# Patient Record
Sex: FEMALE | Race: WHITE | NOT HISPANIC OR LATINO | Employment: UNEMPLOYED | URBAN - METROPOLITAN AREA
[De-identification: names, ages, dates, MRNs, and addresses within clinical notes are randomized per-mention and may not be internally consistent; named-entity substitution may affect disease eponyms.]

---

## 2017-01-04 DIAGNOSIS — I10 ESSENTIAL HYPERTENSION: Primary | ICD-10-CM

## 2017-01-04 RX ORDER — ATENOLOL 100 MG/1
100 TABLET ORAL DAILY
Qty: 90 TABLET | Refills: 2 | Status: SHIPPED | OUTPATIENT
Start: 2017-01-04 | End: 2017-09-28

## 2017-01-04 RX ORDER — LISINOPRIL 20 MG/1
20 TABLET ORAL DAILY
Qty: 90 TABLET | Refills: 2 | Status: SHIPPED | OUTPATIENT
Start: 2017-01-04 | End: 2017-09-28

## 2017-05-05 DIAGNOSIS — Z12.39 SCREENING FOR BREAST CANCER: Primary | ICD-10-CM

## 2017-09-26 NOTE — PROGRESS NOTES
SUBJECTIVE:   Shana Us is a 71 year old female who presents for Preventive Visit. Her last physical was a couple year ago. She would like to talk about HTN, HLD for which she needs refills.  See below  Are you in the first 12 months of your Medicare Part B coverage?  No    Healthy Habits:    Do you get at least three servings of calcium containing foods daily (dairy, green leafy vegetables, etc.)? yes    Amount of exercise or daily activities, outside of work: 5 day(s) per week    Problems taking medications regularly No    Medication side effects: No    Have you had an eye exam in the past two years? yes    Do you see a dentist twice per year? yes    Do you have sleep apnea, excessive snoring or daytime drowsiness?no      Six Item Cognitive Impairment Test   (6CIT):      What year is it?                               Correct - 0 points    What month is it?                               Correct - 0 points      Give the patient an address to remember with five components:   [place] Vie[place] ( first and last name - 2 components)   [address]  (number and name of street - 2 components)   [place] ( city - 1 component)      About what time is it (within the hour)? Correct - 0 points    Count backwards from 20 to 1:   Correct - 0 points    Say the months of the year in reverse: Correct - 0 points    Repeat the address phrase:   Correct - 0 points    Total 6CIT Score:      0/28    Interpretation: The 6CIT uses an inverse score and questions are weighted to produce a total out of 28. Scores of 0-7 are considered normal and 8 or more significant.    Advantages The test has high sensitivity without compromising specificity even in mild dementia. It is easy to translate linguistically and culturally.  Disadvantages The main disadvantage is in the scoring and weighting of the test, which is initially confusing, however computer models have simplified this greatly.    Probability Statistics: At the 7/8 cut off: Overall  figures sensitivity 90% specificity 100%, in mild dementia sensitivity = 78% , specificity = 100%    Copyright 2000 The [facility], [facility], . Courtesy of Dr. Addison Aguilera        Hyperlipidemia Follow-Up      Rate your low fat/cholesterol diet?: good    Taking statin?  No.      Other lipid medications/supplements?:  Red Yeast Rice, without side effects    Fasting today due for labs    Hypertension Follow-up      Outpatient blood pressures are being checked at home.  Results are elevated but variable. Occasionally gets normal readings but frequently high and always high in the clinic.    Low Salt Diet: no added salt    Patient takes her medication on a regular basis. She denies any specific side effects. Though her blood pressure readings have been elevated she denies symptoms associated with high blood pressure including blurred vision, chest pain, heart palpitations, swelling in her feet, and headache. She's been on high blood pressure medication for many years.        All cardiac risk factors reviewed.    Recommended patient take baby aspirin daily for cardio protection.    Reviewed and updated as needed this visit by clinical staff  Tobacco  Allergies  Meds  Problems  Med Hx  Surg Hx  Fam Hx  Soc Hx        Reviewed and updated as needed this visit by Provider  Tobacco  Allergies  Meds  Problems  Med Hx  Surg Hx  Fam Hx  Soc Hx             Today's PHQ-2 Score:   PHQ-2 ( 1999 [employer]) 9/28/2017 9/9/2016   Q1: Little interest or pleasure in doing things 0 0   Q2: Feeling down, depressed or hopeless 0 0   PHQ-2 Score 0 0         Do you have a Health Care Directive?: Yes: Patient states has Advance Directive and will bring in a copy to clinic.    Current providers sharing in care for this patient include:   Patient Care Team:  Maria G Ross MD as PCP - General (Internal Medicine)  Poli Pearson MD as MD (Family Practice)      Hearing impairment: No    Ability to  successfully perform activities of daily living: Yes, no assistance needed     Fall risk:  Fallen 2 or more times in the past year?: No  Any fall with injury in the past year?: No      Home safety:  none identified    The following health maintenance items are reviewed in Epic and correct as of today:  Health Maintenance   Topic Date Due     HEPATITIS C SCREENING  12/12/1963     DEXA Q5 YR  12/12/1976     COLONOSCOPY Q10 YR  10/01/2016     FALL RISK ASSESSMENT  09/28/2018     MAMMO SCREEN Q2 YR (SYSTEM ASSIGNED)  09/26/2019     TETANUS IMMUNIZATION (SYSTEM ASSIGNED)  03/02/2020     LIPID SCREEN Q5 YR FEMALE (SYSTEM ASSIGNED)  12/03/2020     ADVANCE DIRECTIVE PLANNING Q5 YRS  09/28/2022     INFLUENZA VACCINE (SYSTEM ASSIGNED)  Completed     PNEUMOCOCCAL  Completed     BP Readings from Last 3 Encounters:   09/28/17 (!) 167/92   10/25/16 144/88   09/09/16 132/88    Wt Readings from Last 3 Encounters:   09/28/17 122 lb (55.3 kg)   10/25/16 115 lb (52.2 kg)   09/09/16 121 lb 1.9 oz (54.9 kg)            Patient Active Problem List    Diagnosis Date Noted     Basal cell adenoma 09/28/2017     Priority: Medium     Three occurrences.  Under surveillance with dermatology Q6 months in [place].       Essential hypertension with goal blood pressure less than 140/90 09/09/2016     Priority: Medium     Osteoarthritis of knee 10/01/2013     Priority: Medium     R>L       Seasonal allergies 10/01/2013     Priority: Medium     Nonallergic rhinitis, uses saline wash       Atrophic vaginitis 10/01/2013     Priority: Medium       History reviewed. No pertinent past medical history.    Past Surgical History:   Procedure Laterality Date     BREAST BIOPSY, RT/LT      right breast , benign     COLONOSCOPY  2006     FOOT SURGERY       KNEE SURGERY Right 1994     MOHS MICROGRAPHIC PROCEDURE         Family History   Problem Relation Age of Onset     Breast Cancer Mother 45     High cholesterol Mother      Prostate Cancer Father      High  cholesterol Father      Hypertension Father        Social History   Substance Use Topics     Smoking status: Never Smoker     Smokeless tobacco: Never Used     Alcohol use 4.2 oz/week     7 Standard drinks or equivalent per week       Social History     Social History Narrative    Lives with .  Half the year in [place] and half in [place].    Has a good support system.    Feels safe in all environments.    Wears seatbelt 100% of the time    Wears helmet while biking.    Denies history of abuse, past or present, physical, sexual or emotional.               Current Outpatient Prescriptions   Medication Sig Dispense Refill     atenolol (TENORMIN) 100 MG tablet Take 1 tablet (100 mg) by mouth daily 90 tablet 3     lisinopril (PRINIVIL/ZESTRIL) 20 MG tablet Take 1.5 tablets (30 mg) by mouth daily 135 tablet 2     cetirizine (ZYRTEC) 10 MG tablet Take 1 tablet (10 mg) by mouth daily 30 tablet      red yeast rice 600 MG CAPS daily Take one twice daily 60 capsule 11     cholecalciferol (VITAMIN  -D) 1000 UNITS capsule Take 1 capsule by mouth daily        [DISCONTINUED] atenolol (TENORMIN) 100 MG tablet Take 1 tablet (100 mg) by mouth daily 90 tablet 2     [DISCONTINUED] lisinopril (PRINIVIL/ZESTRIL) 20 MG tablet Take 1 tablet (20 mg) by mouth daily 90 tablet 2     Pneumonia Vaccine:Done  Mammogram Screening: Patient over age 50, mutual decision to screen reflected in health maintenance.    ROS:  C: NEGATIVE for fever, chills, change in weight  I: NEGATIVE for worrisome rashes, moles or lesions  E: NEGATIVE for vision changes or irritation  E/M: NEGATIVE for ear, mouth and throat problems  R: NEGATIVE for significant cough or SOB  B: NEGATIVE for masses, tenderness or discharge  CV: NEGATIVE for chest pain, palpitations or peripheral edema  GI: NEGATIVE for nausea, abdominal pain, heartburn, or change in bowel habits  : NEGATIVE for frequency, dysuria, or hematuria   female: atrophic vaginitis declines hormonal  "treatment  M: NEGATIVE for significant arthralgias or myalgia  N: NEGATIVE for weakness, dizziness or paresthesias  E: NEGATIVE for temperature intolerance, skin/hair changes  H: NEGATIVE for bleeding problems  P: NEGATIVE for changes in mood or affect    OBJECTIVE:   BP (!) 167/92 (BP Location: Left arm, Patient Position: Sitting, Cuff Size: Adult Regular)  Pulse 60  Temp 97.3  F (36.3  C) (Oral)  Ht 5' 5\" (165.1 cm)  Wt 122 lb (55.3 kg)  BMI 20.3 kg/m2 Estimated body mass index is 20.3 kg/(m^2) as calculated from the following:    Height as of this encounter: 5' 5\" (165.1 cm).    Weight as of this encounter: 122 lb (55.3 kg).  EXAM:   GENERAL: healthy, alert and no distress  EYES: Eyes grossly normal to inspection, PERRL and conjunctivae and sclerae normal  HENT: ear canals and TM's normal, nose and mouth without ulcers or lesions  NECK: no adenopathy, no asymmetry, masses, or scars and thyroid normal to palpation.  No bruits  RESP: lungs clear to auscultation - no rales, rhonchi or wheezes  BREAST: normal without masses, tenderness or nipple discharge and no palpable axillary masses or adenopathy  CV: regular rate and rhythm, normal S1 S2, no S3 or S4, no murmur, click or rub, no peripheral edema and peripheral pulses strong  ABDOMEN: soft, nontender, no hepatosplenomegaly, no masses and bowel sounds normal  MS: no gross musculoskeletal defects noted, no clubbing, edema or cyanosis of extremities.  Pulses = and appropriate bilaterally to DP and PT  SKIN: no suspicious lesions or rashes  NEURO: Normal strength and tone, mentation intact and speech normal  PSYCH: mentation appears normal, affect normal/bright  LYMPH: no cervical, supraclavicular, axillary, or inguinal adenopathy    ASSESSMENT / PLAN:       ICD-10-CM    1. Routine general medical examination at a health care facility Z00.00 Hemoglobin A1c ([place])   2. Essential hypertension with goal blood pressure less than 140/90 I10 Basic Metabolic Panel " ([place])   3. Special screening for malignant neoplasms, colon Z12.11 GASTROENTEROLOGY ADULT REF PROCEDURE ONLY   4. Screening for osteoporosis Z13.820 Dexa hip/pelvis/spine*   5. Needs flu shot Z23 HC FLU VACCINE, INCREASED ANTIGEN, PRESV FREE     INJECTION INTRAMUSCULAR OR SUB-Q   6. Encounter for screening mammogram for breast cancer Z12.31 Mammogram - routine screening   7. Screening cholesterol level Z13.220 Lipid Panel ([place])   8. Screening for lipid disorders Z13.220    9. Need for hepatitis C screening test Z11.59 Hepatitis C Screen Reflex to HCV RNA Quant and Genotype   10. Basal cell adenoma D36.9    11. Essential hypertension I10 atenolol (TENORMIN) 100 MG tablet     lisinopril (PRINIVIL/ZESTRIL) 20 MG tablet   12. Atrophic vaginitis N95.2        Hypertension is under poor control. Blood pressure continued to be elevated on retake. Recommend increasing lisinopril to 30 mg once daily continue on atenolol at current dose. Continue with healthy lifestyle diet and weight management.    We discussed treatment of a atrophic vaginitis which has been an issue for her for quite some time. He has been very resistant to use the estrogen treatments both cream and as string have been prescribed in the past. She will consider whether or not she wants to get a prescription for this.    I recommend taking a baby aspirin once daily for cardio protection.  Labs and health maintenance all reviewed.    End of Life Planning:  Patient currently has an advanced directive: Yes.  Practitioner is supportive of decision.    COUNSELING:  Reviewed preventive health counseling, as reflected in patient instructions       Regular exercise       Healthy diet/nutrition       Vision screening       Hearing screening       Dental care       Immunizations    Vaccinated for: Influenza           Aspirin Prophylaxsis       Osteoporosis Prevention/Bone Health       Safe sex practices/STD prevention       Colon cancer screening        "Hepatitis C screening       Advanced Planning       Estimated body mass index is 20.3 kg/(m^2) as calculated from the following:    Height as of this encounter: 5' 5\" (165.1 cm).    Weight as of this encounter: 122 lb (55.3 kg).     reports that she has never smoked. She has never used smokeless tobacco.    Appropriate preventive services were discussed with this patient, including applicable screening as appropriate for cardiovascular disease, diabetes, osteopenia/osteoporosis, and glaucoma.  As appropriate for age/gender, discussed screening for colorectal cancer, prostate cancer, breast cancer, and cervical cancer. Checklist reviewing preventive services available has been given to the patient.    Reviewed patients plan of care and provided an AVS. The Basic Care Plan (routine screening as documented in Health Maintenance) for Shana meets the Care Plan requirement. This Care Plan has been established and reviewed with the Patient.    Counseling Resources:  ATP IV Guidelines  Pooled Cohorts Equation Calculator  Breast Cancer Risk Calculator  FRAX Risk Assessment  ICSI Preventive Guidelines  Dietary Guidelines for Americans, 2010  USDA's MyPlate  ASA Prophylaxis  Lung CA Screening    Giulia Hairston PA-C  [facility]  "

## 2017-09-26 NOTE — PATIENT INSTRUCTIONS

## 2017-09-28 ENCOUNTER — OFFICE VISIT (OUTPATIENT)
Dept: FAMILY MEDICINE | Facility: CLINIC | Age: 72
End: 2017-09-28

## 2017-09-28 VITALS
HEIGHT: 65 IN | WEIGHT: 122 LBS | DIASTOLIC BLOOD PRESSURE: 92 MMHG | BODY MASS INDEX: 20.33 KG/M2 | SYSTOLIC BLOOD PRESSURE: 167 MMHG | HEART RATE: 60 BPM | TEMPERATURE: 97.3 F

## 2017-09-28 DIAGNOSIS — N95.2 ATROPHIC VAGINITIS: ICD-10-CM

## 2017-09-28 DIAGNOSIS — Z13.820 SCREENING FOR OSTEOPOROSIS: ICD-10-CM

## 2017-09-28 DIAGNOSIS — Z11.59 NEED FOR HEPATITIS C SCREENING TEST: ICD-10-CM

## 2017-09-28 DIAGNOSIS — Z23 NEEDS FLU SHOT: ICD-10-CM

## 2017-09-28 DIAGNOSIS — I10 ESSENTIAL HYPERTENSION: ICD-10-CM

## 2017-09-28 DIAGNOSIS — I10 ESSENTIAL HYPERTENSION WITH GOAL BLOOD PRESSURE LESS THAN 140/90: Chronic | ICD-10-CM

## 2017-09-28 DIAGNOSIS — Z13.220 SCREENING CHOLESTEROL LEVEL: ICD-10-CM

## 2017-09-28 DIAGNOSIS — Z12.11 SPECIAL SCREENING FOR MALIGNANT NEOPLASMS, COLON: ICD-10-CM

## 2017-09-28 DIAGNOSIS — Z12.31 ENCOUNTER FOR SCREENING MAMMOGRAM FOR BREAST CANCER: ICD-10-CM

## 2017-09-28 DIAGNOSIS — Z00.00 ROUTINE GENERAL MEDICAL EXAMINATION AT A HEALTH CARE FACILITY: Primary | ICD-10-CM

## 2017-09-28 DIAGNOSIS — D11.9 BASAL CELL ADENOMA: ICD-10-CM

## 2017-09-28 DIAGNOSIS — Z13.220 SCREENING FOR LIPID DISORDERS: ICD-10-CM

## 2017-09-28 LAB
BUN SERPL-MCNC: 12 MG/DL (ref 7–30)
CALCIUM SERPL-MCNC: 10 MG/DL (ref 8.5–10.4)
CHLORIDE SERPLBLD-SCNC: 95 MMOL/L (ref 94–109)
CHOLEST SERPL-MCNC: 279 MG/DL (ref 0–200)
CHOLEST/HDLC SERPL: 3.3 {RATIO} (ref 0–5)
CO2 SERPL-SCNC: 27 MMOL/L (ref 20–32)
CREAT SERPL-MCNC: 0.7 MG/DL (ref 0.6–1.3)
EGFR CALCULATED (BLACK REFERENCE): 106.4
EGFR CALCULATED (NON BLACK REFERENCE): 87.9
FASTING SPECIMEN: NO
GLUCOSE SERPL-MCNC: 105 MG/DL (ref 60–109)
HBA1C MFR BLD: 5.5 % (ref 4.1–5.7)
HCV AB SERPL QL IA: NONREACTIVE
HDLC SERPL-MCNC: 85 MG/DL
LDLC SERPL CALC-MCNC: 172 MG/DL (ref 0–129)
POTASSIUM SERPL-SCNC: 5 MMOL/L (ref 3.4–5.3)
SODIUM SERPL-SCNC: 135 MMOL/L (ref 133–144)
TRIGL SERPL-MCNC: 110 MG/DL (ref 0–150)
VLDL-CHOLESTEROL: 22 (ref 7–32)

## 2017-09-28 RX ORDER — ATENOLOL 100 MG/1
100 TABLET ORAL DAILY
Qty: 90 TABLET | Refills: 3 | Status: SHIPPED | OUTPATIENT
Start: 2017-09-28 | End: 2018-10-02

## 2017-09-28 RX ORDER — LISINOPRIL 20 MG/1
30 TABLET ORAL DAILY
Qty: 135 TABLET | Refills: 2 | Status: SHIPPED | OUTPATIENT
Start: 2017-09-28 | End: 2018-07-27

## 2017-09-28 ASSESSMENT — PAIN SCALES - GENERAL: PAINLEVEL: NO PAIN (0)

## 2017-09-28 NOTE — MR AVS SNAPSHOT
After Visit Summary   9/28/2017    Shana Us    MRN: [id number]           Patient Information     Date Of Birth          1945        Visit Information        Provider Department      9/28/2017 10:00 AM Giulia Hairston PA-C [facility]        Today's Diagnoses     Routine general medical examination at a health care facility    -  1    Essential hypertension with goal blood pressure less than 140/90        Special screening for malignant neoplasms, colon        Screening for osteoporosis        Needs flu shot        Encounter for screening mammogram for breast cancer        Screening cholesterol level        Screening for lipid disorders        Need for hepatitis C screening test        Basal cell adenoma        Essential hypertension        Atrophic vaginitis          Care Instructions      Preventive Health Recommendations  Female Ages 65 +    Yearly exam:     See your health care provider every year in order to  o Review health changes.   o Discuss preventive care.    o Review your medicines if your doctor has prescribed any.      You no longer need a yearly Pap test unless you've had an abnormal Pap test in the past 10 years. If you have vaginal symptoms, such as bleeding or discharge, be sure to talk with your provider about a Pap test.      Every 1 to 2 years, have a mammogram.  If you are over 69, talk with your health care provider about whether or not you want to continue having screening mammograms.      Every 10 years, have a colonoscopy. Or, have a yearly FIT test (stool test). These exams will check for colon cancer.       Have a cholesterol test every 5 years, or more often if your doctor advises it.       Have a diabetes test (fasting glucose) every three years. If you are at risk for diabetes, you should have this test more often.       At age 65, have a bone density scan (DEXA) to check for osteoporosis (brittle bone disease).    Shots:    Get a flu shot each  year.    Get a tetanus shot every 10 years.    Talk to your doctor about your pneumonia vaccines. There are now two you should receive - Pneumovax (PPSV 23) and Prevnar (PCV 13).    Talk to your doctor about the shingles vaccine.    Talk to your doctor about the hepatitis B vaccine.    Nutrition:     Eat at least 5 servings of fruits and vegetables each day.      Eat whole-grain bread, whole-wheat pasta and brown rice instead of white grains and rice.      Talk to your provider about Calcium and Vitamin D.     Lifestyle    Exercise at least 150 minutes a week (30 minutes a day, 5 days a week). This will help you control your weight and prevent disease.      Limit alcohol to one drink per day.      No smoking.       Wear sunscreen to prevent skin cancer.       See your dentist twice a year for an exam and cleaning.      See your eye doctor every 1 to 2 years to screen for conditions such as glaucoma, macular degeneration, cataracts, etc   Preventive Health Recommendations  Female Ages 65 +    Yearly exam:   See your health care provider every year in order to  Review health changes.   Discuss preventive care.    Review your medicines if your doctor has prescribed any.    You no longer need a yearly Pap test unless you've had an abnormal Pap test in the past 10 years. If you have vaginal symptoms, such as bleeding or discharge, be sure to talk with your provider about a Pap test.    Every 1 to 2 years, have a mammogram.  If you are over 69, talk with your health care provider about whether or not you want to continue having screening mammograms.    Every 10 years, have a colonoscopy. Or, have a yearly FIT test (stool test). These exams will check for colon cancer.     Have a cholesterol test every 5 years, or more often if your doctor advises it.     Have a diabetes test (fasting glucose) every three years. If you are at risk for diabetes, you should have this test more often.     At age 65, have a bone density scan  (DEXA) to check for osteoporosis (brittle bone disease).    Shots:  Get a flu shot each year.  Get a tetanus shot every 10 years.  Talk to your doctor about your pneumonia vaccines. There are now two you should receive - Pneumovax (PPSV 23) and Prevnar (PCV 13).  Talk to your doctor about the shingles vaccine.  Talk to your doctor about the hepatitis B vaccine.    Nutrition:   Eat at least 5 servings of fruits and vegetables each day.    Eat whole-grain bread, whole-wheat pasta and brown rice instead of white grains and rice.    Talk to your provider about Calcium and Vitamin D.     Lifestyle  Exercise at least 150 minutes a week (30 minutes a day, 5 days a week). This will help you control your weight and prevent disease.    Limit alcohol to one drink per day.    No smoking.     Wear sunscreen to prevent skin cancer.     See your dentist twice a year for an exam and cleaning.    See your eye doctor every 1 to 2 years to screen for conditions such as glaucoma, macular degeneration, cataracts, etc           Follow-ups after your visit        Additional Services     GASTROENTEROLOGY ADULT REF PROCEDURE ONLY       Last Lab Result: Creatinine (mg/dL)       Date                     Value                 10/25/2016               0.72             ----------  Body mass index is 20.3 kg/(m^2).     Needed:  No  Language:  English    Patient will be contacted to schedule procedure.     Please be aware that coverage of these services is subject to the terms and limitations of your health insurance plan.  Call member services at your health plan with any benefit or coverage questions.  Any procedures must be performed at a [facility] facility OR coordinated by your clinic's referral office.    Please bring the following with you to your appointment:    (1) Any X-Rays, CTs or MRIs which have been performed.  Contact the facility where they were done to arrange for  prior to your scheduled appointment.    (2) List  "of current medications   (3) This referral request   (4) Any documents/labs given to you for this referral                  Future tests that were ordered for you today     Open Future Orders        Priority Expected Expires Ordered    Dexa hip/pelvis/spine* Routine  9/25/2018 9/28/2017    Mammogram - routine screening Routine  9/25/2018 9/28/2017            Who to contact     Please call your clinic at [phone] to:    Ask questions about your health    Make or cancel appointments    Discuss your medicines    Learn about your test results    Speak to your doctor   If you have compliments or concerns about an experience at your clinic, or if you wish to file a complaint, please contact [facility] Patient Relations at [phone] or email us at [email or web address]         Additional Information About Your Visit        BAM LabsharBitStash Information     Copyright Agent gives you secure access to your electronic health record. If you see a primary care provider, you can also send messages to your care team and make appointments. If you have questions, please call your primary care clinic.  If you do not have a primary care provider, please call [phone] and they will assist you.      Copyright Agent is an electronic gateway that provides easy, online access to your medical records. With Copyright Agent, you can request a clinic appointment, read your test results, renew a prescription or communicate with your care team.     To access your existing account, please contact your [facility] or call [phone] for assistance.        Care EveryWhere ID     This is your Care EveryWhere ID. This could be used by other organizations to access your [place] medical records  [id number]        Your Vitals Were     Pulse Temperature Height BMI (Body Mass Index)          60 97.3  F (36.3  C) (Oral) 5' 5\" (165.1 cm) 20.3 kg/m2         Blood Pressure from Last 3 Encounters:   09/28/17 " 168/90   10/25/16 144/88   09/09/16 132/88    Weight from Last 3 Encounters:   09/28/17 122 lb (55.3 kg)   10/25/16 115 lb (52.2 kg)   09/09/16 121 lb 1.9 oz (54.9 kg)              We Performed the Following     Basic Metabolic Panel ([place])     GASTROENTEROLOGY ADULT REF PROCEDURE ONLY     HC FLU VACCINE, INCREASED ANTIGEN, PRESV FREE     Hemoglobin A1c ([place])     Hepatitis C Screen Reflex to HCV RNA Quant and Genotype     INJECTION INTRAMUSCULAR OR SUB-Q     Lipid Panel ([place])          Today's Medication Changes          These changes are accurate as of: 9/28/17 11:19 AM.  If you have any questions, ask your nurse or doctor.               These medicines have changed or have updated prescriptions.        Dose/Directions    lisinopril 20 MG tablet   Commonly known as:  PRINIVIL/ZESTRIL   This may have changed:  how much to take   Used for:  Essential hypertension   Changed by:  Giulia Hairston PA-C        Dose:  30 mg   Take 1.5 tablets (30 mg) by mouth daily   Quantity:  135 tablet   Refills:  2            Where to get your medicines      These medications were sent to [facility]/pharmacy #[id number] - [facility] [address] [place] [address]  [address] [place] [address]     Phone:  [phone]     atenolol 100 MG tablet    lisinopril 20 MG tablet                Primary Care Provider Office Phone # Fax #    Maria G Apoorva Ross [phone]       [address] [place] [address]        Equal Access to Services     PATSY GAVIN : Hadii radha ku hadasho Soomaali, waaxda luqadaha, qaybta kaalmada adeegyada, archana scott . So [facility] [phone].    ATENCIÓN: Si habla español, tiene a lewis disposición servicios gratuitos de asistencia lingüística. Nancy hooper [phone].    We comply with applicable federal civil rights laws and [place] laws. We do not discriminate on the basis of race, color, national origin, age, disability sex, sexual orientation  or gender identity.            Thank you!     Thank you for choosing [facility]  for your care. Our goal is always to provide you with excellent care. Hearing back from our patients is one way we can continue to improve our services. Please take a few minutes to complete the written survey that you may receive in the mail after your visit with us. Thank you!             Your Updated Medication List - Protect others around you: Learn how to safely use, store and throw away your medicines at www.disposemymeds.org.          This list is accurate as of: 9/28/17 11:19 AM.  Always use your most recent med list.                   Brand Name Dispense Instructions for use Diagnosis    atenolol 100 MG tablet    TENORMIN    90 tablet    Take 1 tablet (100 mg) by mouth daily    Essential hypertension       cetirizine 10 MG tablet    zyrTEC    30 tablet    Take 1 tablet (10 mg) by mouth daily        cholecalciferol 1000 UNITS capsule    vitamin  -D     Take 1 capsule by mouth daily        lisinopril 20 MG tablet    PRINIVIL/ZESTRIL    135 tablet    Take 1.5 tablets (30 mg) by mouth daily    Essential hypertension       red yeast rice 600 MG Caps     60 capsule    daily Take one twice daily

## 2017-09-28 NOTE — NURSING NOTE
"71 year old  Chief Complaint   Patient presents with     Physical     pt. is fasting       Blood pressure 152/86, pulse 60, temperature 97.3  F (36.3  C), temperature source Oral, height 5' 5\" (165.1 cm), weight 122 lb (55.3 kg). Body mass index is 20.3 kg/(m^2).  Patient Active Problem List   Diagnosis     Osteoarthritis of knee     Seasonal allergies     Atrophic vaginitis     Essential hypertension with goal blood pressure less than 140/90       Wt Readings from Last 2 Encounters:   09/28/17 122 lb (55.3 kg)   10/25/16 115 lb (52.2 kg)     BP Readings from Last 3 Encounters:   09/28/17 152/86   10/25/16 144/88   09/09/16 132/88         Current Outpatient Prescriptions   Medication     atenolol (TENORMIN) 100 MG tablet     lisinopril (PRINIVIL/ZESTRIL) 20 MG tablet     cetirizine (ZYRTEC) 10 MG tablet     red yeast rice 600 MG CAPS     cholecalciferol (VITAMIN  -D) 1000 UNITS capsule     No current facility-administered medications for this visit.        Social History   Substance Use Topics     Smoking status: Never Smoker     Smokeless tobacco: Never Used     Alcohol use Yes       Health Maintenance Due   Topic Date Due     HEPATITIS C SCREENING  12/12/1964     DEXA Q5 YR  12/12/1976     ADVANCE DIRECTIVE PLANNING Q5 YRS  12/12/2001     FALL RISK ASSESSMENT  12/12/2011     COLONOSCOPY Q10 YR  10/01/2016     INFLUENZA VACCINE (SYSTEM ASSIGNED)  09/01/2017       No results found for: ANDRA Cerna, L.P.NRachel  September 28, 2017 10:05 AM                Shana Us      1.  Has the patient received the information for the influenza vaccine? YES    2.  Does the patient have any of the following contraindications?     Allergy to eggs? No     Allergic reaction to previous influenza vaccines? No     Any other problems to previous influenza vaccines? No     Paralyzed by Guillain-Joice syndrome? No     Currently pregnant? NO     Current moderate or severe illness? No     Allergy to contact lens solution? No    3. "  The vaccine has been administered in the usual fashion and the patient was instructed to wait 20 minutes before leaving the building in the event of an allergic reaction: YES    Vaccination given by   Gagan Capps, CMA    Recorded by GAGAN CAPPS

## 2017-10-10 PROBLEM — E78.5 HYPERLIPIDEMIA LDL GOAL <100: Status: ACTIVE | Noted: 2017-10-10

## 2017-10-10 NOTE — PROGRESS NOTES
"Shana Us is a 71 year old female here for the following issues:    Hyperlipidemia  Shana was seen recently for general check up and had fasting lipids done at that visit. She received lab results with recommendation to start a statin drug. She is here to discuss her lab results. She has questions about statin drugs. She has hx of hypertension. Nonsmoker, no DM. No family hx of ASCVD, no personal hx.     Recent Labs   Lab Test  09/28/17   1046  12/03/15   1446  11/25/14   0917   CHOL  279.0*  236*  264.0*   HDL  85.0  70  71.0   LDL  172.0*  139*  172.0*   TRIG  110.0  138  107.0   CHOLHDLRATIO  3.3   --   3.7     The 10-year ASCVD risk score ([place] PRASAD Jr, et al., 2013) is: 20.9%    HTN  She was last seen in clinic 9/28. She was on atenolol 100mg daily and Lisinopril 20mg daily. Lisinopril was increased to 30mg daily. She was encouraged to follow up. She brings in her BP machine and it correlates with our readings.  Her machine: 126/87  BP Readings from Last 3 Encounters:   10/11/17 125/80   09/28/17 (!) 167/92   10/25/16 144/88       Patient Active Problem List   Diagnosis     Osteoarthritis of knee     Seasonal allergies     Atrophic vaginitis     Essential hypertension with goal blood pressure less than 140/90     Basal cell adenoma       Current Outpatient Prescriptions   Medication Sig Dispense Refill     atenolol (TENORMIN) 100 MG tablet Take 1 tablet (100 mg) by mouth daily 90 tablet 3     lisinopril (PRINIVIL/ZESTRIL) 20 MG tablet Take 1.5 tablets (30 mg) by mouth daily 135 tablet 2     cetirizine (ZYRTEC) 10 MG tablet Take 1 tablet (10 mg) by mouth daily 30 tablet      red yeast rice 600 MG CAPS daily Take one twice daily 60 capsule 11     cholecalciferol (VITAMIN  -D) 1000 UNITS capsule Take 1 capsule by mouth daily          No Known Allergies     EXAM  /80 (BP Location: Left arm, Patient Position: Chair, Cuff Size: Adult Regular)  Pulse 72  Temp 97.3  F (36.3  C) (Oral)  Ht 5' 5\" (165.1 cm)  " Wt 123 lb (55.8 kg)  SpO2 99%  BMI 20.47 kg/m2  Gen: Alert, pleasant, NAD      Assessment:  (I10) Essential hypertension with goal blood pressure less than 140/90  (primary encounter diagnosis)  Comment: Blood pressure in target range  Plan: continue atenolol 100mg and lisinopril 30mg daily    (E78.5) Hyperlipidemia LDL goal <100  Comment: 10 yr ASCVD risk score is 12.2%  Recent Labs   Lab Test  09/28/17   1046  12/03/15   1446  11/25/14   0917   CHOL  279.0*  236*  264.0*   HDL  85.0  70  71.0   LDL  172.0*  139*  172.0*   TRIG  110.0  138  107.0   CHOLHDLRATIO  3.3   --   3.7     Plan: pravastatin (PRAVACHOL) 10 MG tablet, Lipid         Profile, ALT, AST        Recommend statin use. She has many questions. Discussed potential side effects. Recheck levels in 6-8 wks.     Maria G Ross MD  Internal Medicine/Pediatrics

## 2017-10-11 ENCOUNTER — OFFICE VISIT (OUTPATIENT)
Dept: FAMILY MEDICINE | Facility: CLINIC | Age: 72
End: 2017-10-11

## 2017-10-11 VITALS
TEMPERATURE: 97.3 F | HEART RATE: 72 BPM | SYSTOLIC BLOOD PRESSURE: 125 MMHG | WEIGHT: 123 LBS | OXYGEN SATURATION: 99 % | HEIGHT: 65 IN | BODY MASS INDEX: 20.49 KG/M2 | DIASTOLIC BLOOD PRESSURE: 80 MMHG

## 2017-10-11 DIAGNOSIS — E78.5 HYPERLIPIDEMIA LDL GOAL <100: ICD-10-CM

## 2017-10-11 DIAGNOSIS — I10 ESSENTIAL HYPERTENSION WITH GOAL BLOOD PRESSURE LESS THAN 140/90: Primary | Chronic | ICD-10-CM

## 2017-10-11 RX ORDER — PRAVASTATIN SODIUM 10 MG
10 TABLET ORAL DAILY
Qty: 90 TABLET | Refills: 1 | Status: SHIPPED | OUTPATIENT
Start: 2017-10-11 | End: 2018-03-19

## 2017-10-11 ASSESSMENT — PAIN SCALES - GENERAL: PAINLEVEL: NO PAIN (0)

## 2017-10-11 NOTE — MR AVS SNAPSHOT
After Visit Summary   10/11/2017    Shana Us    MRN: [id number]           Patient Information     Date Of Birth          1945        Visit Information        Provider Department      10/11/2017 3:00 PM Maria G Ross MD [facility]        Today's Diagnoses     Essential hypertension with goal blood pressure less than 140/90    -  1    Hyperlipidemia LDL goal <100          Care Instructions    The 10-year ASCVD risk score ([place] PRASAD Jr, et al., 2013) is: 12.2%    Values used to calculate the score:      Age: 71 years      Sex: Female      Is Non- : No      Diabetic: No      Tobacco smoker: No      Systolic Blood Pressure: 125 mmHg      Is BP treated: Yes      HDL Cholesterol: 85 mg/dL      Total Cholesterol: 279 mg/dL            Follow-ups after your visit        Future tests that were ordered for you today     Open Future Orders        Priority Expected Expires Ordered    Lipid Profile Routine 1/1/2018 10/11/2018 10/11/2017    ALT Routine 1/1/2018 10/11/2018 10/11/2017    AST Routine 1/1/2018 10/11/2018 10/11/2017            Who to contact     Please call your clinic at [phone] to:    Ask questions about your health    Make or cancel appointments    Discuss your medicines    Learn about your test results    Speak to your doctor   If you have compliments or concerns about an experience at your clinic, or if you wish to file a complaint, please contact [facility] Patient Relations at [phone] or email us at [email or web address]         Additional Information About Your Visit        MyChart Information     [employer] Tradert gives you secure access to your electronic health record. If you see a primary care provider, you can also send messages to your care team and make appointments. If you have questions, please call your primary care clinic.  If you do not have a primary care provider, please call [phone] and they  "will assist you.      OmnyPay is an electronic gateway that provides easy, online access to your medical records. With OmnyPay, you can request a clinic appointment, read your test results, renew a prescription or communicate with your care team.     To access your existing account, please contact your [facility] or call [phone] for assistance.        Care EveryWhere ID     This is your Care EveryWhere ID. This could be used by other organizations to access your [place] medical records  [id number]        Your Vitals Were     Pulse Temperature Height Pulse Oximetry BMI (Body Mass Index)       72 97.3  F (36.3  C) (Oral) 5' 5\" (165.1 cm) 99% 20.47 kg/m2        Blood Pressure from Last 3 Encounters:   10/11/17 125/80   09/28/17 (!) 167/92   10/25/16 144/88    Weight from Last 3 Encounters:   10/11/17 123 lb (55.8 kg)   09/28/17 122 lb (55.3 kg)   10/25/16 115 lb (52.2 kg)                 Today's Medication Changes          These changes are accurate as of: 10/11/17  3:53 PM.  If you have any questions, ask your nurse or doctor.               Start taking these medicines.        Dose/Directions    pravastatin 10 MG tablet   Commonly known as:  PRAVACHOL   Used for:  Hyperlipidemia LDL goal <100   Started by:  Maria G Ross MD        Dose:  10 mg   Take 1 tablet (10 mg) by mouth daily   Quantity:  90 tablet   Refills:  1            Where to get your medicines      These medications were sent to [facility]/pharmacy #[id number] - [facility] [address] [place] [address]     Phone:  [phone]     pravastatin 10 MG tablet                Primary Care Provider Office Phone # Fax #    Maria G Ross [phone]       [address] [place] [address]        Equal Access to Services     PATSY GAVIN AH: Romario coppola Soteodora, waaxda luqadaha, qaybta kaalarchana zheng. " So [facility] [phone].    ATENCIÓN: Si habla leticia, tiene a lewis disposición servicios gratuitos de asistencia lingüística. Nancy hooper [phone].    We comply with applicable federal civil rights laws and [place] laws. We do not discriminate on the basis of race, color, national origin, age, disability, sex, sexual orientation, or gender identity.            Thank you!     Thank you for choosing [facility]  for your care. Our goal is always to provide you with excellent care. Hearing back from our patients is one way we can continue to improve our services. Please take a few minutes to complete the written survey that you may receive in the mail after your visit with us. Thank you!             Your Updated Medication List - Protect others around you: Learn how to safely use, store and throw away your medicines at www.disposemymeds.org.          This list is accurate as of: 10/11/17  3:53 PM.  Always use your most recent med list.                   Brand Name Dispense Instructions for use Diagnosis    atenolol 100 MG tablet    TENORMIN    90 tablet    Take 1 tablet (100 mg) by mouth daily    Essential hypertension       cetirizine 10 MG tablet    zyrTEC    30 tablet    Take 1 tablet (10 mg) by mouth daily        cholecalciferol 1000 UNITS capsule    vitamin  -D     Take 1 capsule by mouth daily        lisinopril 20 MG tablet    PRINIVIL/ZESTRIL    135 tablet    Take 1.5 tablets (30 mg) by mouth daily    Essential hypertension       pravastatin 10 MG tablet    PRAVACHOL    90 tablet    Take 1 tablet (10 mg) by mouth daily    Hyperlipidemia LDL goal <100       red yeast rice 600 MG Caps     60 capsule    daily Take one twice daily

## 2017-10-11 NOTE — NURSING NOTE
"71 year old  Chief Complaint   Patient presents with     Results     review her lab tests.        Blood pressure 125/80, pulse 72, temperature 97.3  F (36.3  C), temperature source Oral, height 5' 5\" (165.1 cm), weight 123 lb (55.8 kg), SpO2 99 %. Body mass index is 20.47 kg/(m^2).  Patient Active Problem List   Diagnosis     Osteoarthritis of knee     Seasonal allergies     Atrophic vaginitis     Essential hypertension with goal blood pressure less than 140/90     Basal cell adenoma     Hyperlipidemia LDL goal <100       Wt Readings from Last 2 Encounters:   10/11/17 123 lb (55.8 kg)   09/28/17 122 lb (55.3 kg)     BP Readings from Last 3 Encounters:   10/11/17 125/80   09/28/17 (!) 167/92   10/25/16 144/88         Current Outpatient Prescriptions   Medication     atenolol (TENORMIN) 100 MG tablet     lisinopril (PRINIVIL/ZESTRIL) 20 MG tablet     cetirizine (ZYRTEC) 10 MG tablet     red yeast rice 600 MG CAPS     cholecalciferol (VITAMIN  -D) 1000 UNITS capsule     No current facility-administered medications for this visit.        Social History   Substance Use Topics     Smoking status: Never Smoker     Smokeless tobacco: Never Used     Alcohol use 4.2 oz/week     7 Standard drinks or equivalent per week       Health Maintenance Due   Topic Date Due     DEXA Q5 YR  12/12/1976     COLONOSCOPY Q10 YR  10/01/2016       No results found for: ANDRA Banuelos, KAMERON  October 11, 2017 2:51 PM  "

## 2017-10-11 NOTE — PATIENT INSTRUCTIONS
The 10-year ASCVD risk score ([place]die PRASAD Jr et al., 2013) is: 12.2%    Values used to calculate the score:      Age: 71 years      Sex: Female      Is Non- : No      Diabetic: No      Tobacco smoker: No      Systolic Blood Pressure: 125 mmHg      Is BP treated: Yes      HDL Cholesterol: 85 mg/dL      Total Cholesterol: 279 mg/dL

## 2018-01-05 DIAGNOSIS — E78.5 HYPERLIPIDEMIA LDL GOAL <100: ICD-10-CM

## 2018-01-05 LAB
ALT SERPL W P-5'-P-CCNC: 23 U/L (ref 0–50)
AST SERPL W P-5'-P-CCNC: 14 U/L (ref 0–45)
CHOLEST SERPL-MCNC: 199 MG/DL
HDLC SERPL-MCNC: 61 MG/DL
LDLC SERPL CALC-MCNC: 120 MG/DL
NONHDLC SERPL-MCNC: 138 MG/DL
TRIGL SERPL-MCNC: 91 MG/DL

## 2018-03-19 DIAGNOSIS — E78.5 HYPERLIPIDEMIA LDL GOAL <100: ICD-10-CM

## 2018-03-19 RX ORDER — PRAVASTATIN SODIUM 10 MG
10 TABLET ORAL DAILY
Qty: 90 TABLET | Refills: 1 | Status: SHIPPED | OUTPATIENT
Start: 2018-03-19 | End: 2018-10-02

## 2018-03-19 NOTE — TELEPHONE ENCOUNTER
Prescription approved per [facility] Refill Protocol.      Ame Maradiaga RN  March 19, 2018 9:35 AM

## 2018-05-31 ENCOUNTER — OFFICE VISIT (OUTPATIENT)
Dept: FAMILY MEDICINE | Facility: CLINIC | Age: 73
End: 2018-05-31
Payer: COMMERCIAL

## 2018-05-31 VITALS
DIASTOLIC BLOOD PRESSURE: 85 MMHG | WEIGHT: 123.5 LBS | OXYGEN SATURATION: 100 % | HEIGHT: 64 IN | HEART RATE: 64 BPM | BODY MASS INDEX: 21.08 KG/M2 | RESPIRATION RATE: 16 BRPM | TEMPERATURE: 97.8 F | SYSTOLIC BLOOD PRESSURE: 134 MMHG

## 2018-05-31 DIAGNOSIS — R30.0 DYSURIA: ICD-10-CM

## 2018-05-31 DIAGNOSIS — N30.01 ACUTE CYSTITIS WITH HEMATURIA: Primary | ICD-10-CM

## 2018-05-31 LAB
BILIRUBIN UR: NEGATIVE MG/DL
BLOOD UR: NORMAL MG/DL
CLARITY, URINE: NORMAL
COLOR UR: NORMAL
GLUCOSE URINE: NEGATIVE MG/DL
KETONES UR QL: NEGATIVE MG/DL
LEUKOCYTE ESTERASE UR: NORMAL U/L
NITRITE UR QL STRIP: NEGATIVE MG/DL
PH UR STRIP: 7 [PH] (ref 5–7)
PROTEIN UR: NORMAL MG/DL
SP GR UR STRIP: 1.01
UROBILINOGEN UR STRIP-ACNC: NORMAL E.U./DL

## 2018-05-31 RX ORDER — SULFAMETHOXAZOLE/TRIMETHOPRIM 800-160 MG
1 TABLET ORAL 2 TIMES DAILY
Qty: 6 TABLET | Refills: 0 | Status: SHIPPED | OUTPATIENT
Start: 2018-05-31 | End: 2018-06-03

## 2018-05-31 RX ORDER — SULFAMETHOXAZOLE AND TRIMETHOPRIM 400; 80 MG/1; MG/1
1 TABLET ORAL 2 TIMES DAILY
Qty: 6 TABLET | Refills: 0 | Status: SHIPPED | OUTPATIENT
Start: 2018-05-31 | End: 2018-05-31 | Stop reason: DRUGHIGH

## 2018-05-31 ASSESSMENT — ANXIETY QUESTIONNAIRES
2. NOT BEING ABLE TO STOP OR CONTROL WORRYING: NOT AT ALL
1. FEELING NERVOUS, ANXIOUS, OR ON EDGE: NOT AT ALL
IF YOU CHECKED OFF ANY PROBLEMS ON THIS QUESTIONNAIRE, HOW DIFFICULT HAVE THESE PROBLEMS MADE IT FOR YOU TO DO YOUR WORK, TAKE CARE OF THINGS AT HOME, OR GET ALONG WITH OTHER PEOPLE: NOT DIFFICULT AT ALL
5. BEING SO RESTLESS THAT IT IS HARD TO SIT STILL: NOT AT ALL
6. BECOMING EASILY ANNOYED OR IRRITABLE: NOT AT ALL
7. FEELING AFRAID AS IF SOMETHING AWFUL MIGHT HAPPEN: NOT AT ALL
GAD7 TOTAL SCORE: 0
3. WORRYING TOO MUCH ABOUT DIFFERENT THINGS: NOT AT ALL

## 2018-05-31 ASSESSMENT — PATIENT HEALTH QUESTIONNAIRE - PHQ9: 5. POOR APPETITE OR OVEREATING: NOT AT ALL

## 2018-05-31 NOTE — MR AVS SNAPSHOT
"              After Visit Summary   5/31/2018    Shana sU    MRN: [id number]           Patient Information     Date Of Birth          1945        Visit Information        Provider Department      5/31/2018 11:00 AM Lluvia Mejia APRN [facility] [employer] [facility]        Today's Diagnoses     Acute cystitis with hematuria    -  1    Dysuria          Care Instructions    1) Will start Bactrim for 3 days.  2) Return to clinic in 2 weeks for another urinalysis to make sure the blood in your urine has resolved.          * BLADDER INFECTION,Female (Adult)    A bladder infection (\"cystitis\" or \"UTI\") usually causes a constant urge to urinate and a burning when passing urine. Urine may be cloudy, smelly or dark. There may be pain in the lower abdomen. A bladder infection occurs when bacteria from the vaginal area enter the bladder opening (urethra). This can occur from sexual intercourse, wearing tight clothing, dehydration and other factors.  HOME CARE:  1. Drink lots of fluids (at least 6-8 glasses a day, unless you must restrict fluids for other medical reasons). This will force the medicine into your urinary system and flush the bacteria out of your body. Cranberry juice has been shown to help clear out the bacteria.  2. Avoid sexual intercourse until your symptoms are gone.  3. A bladder infection is treated with antibiotics. You may also be given Pyridium (generic = phenazopyridine) to reduce the burning sensation. This medicine will cause your urine to become a bright orange color. The orange urine may stain clothing. You may wear a pad or panty-liner to protect clothing.  PREVENTING FUTURE INFECTIONS:  1. Always wipe from front to back after a bowel movement.  2. Keep the genital area clean and dry.  3. Drink plenty of fluids each day to avoid dehydration.  4. Urinate right after intercourse to flush out the bladder.  5. Wear cotton underwear and cotton-lined panty hose; avoid tight-fitting pants.  6. If " you are on birth control pills and are having frequent bladder infections, discuss with your doctor.  FOLLOW UP: Return to this facility or see your doctor if ALL symptoms are not gone after three days of treatment.  GET PROMPT MEDICAL ATTENTION if any of the following occur:    Fever over 101 F (38.3 C)    No improvement by the third day of treatment    Increasing back or abdominal pain    Repeated vomiting; unable to keep medicine down    Weakness, dizziness or fainting    Vaginal discharge    Pain, redness or swelling in the labia (outer vaginal area)    6927-7670 [employer]. [address] [place] [address]. All rights reserved. This information is not intended as a substitute for professional medical care. Always follow your healthcare professional's instructions.  This information has been modified by your health care provider with permission from the publisher.            Follow-ups after your visit        Who to contact     Please call your clinic at [phone] to:    Ask questions about your health    Make or cancel appointments    Discuss your medicines    Learn about your test results    Speak to your doctor            Additional Information About Your Visit        Runner Information     Runner gives you secure access to your electronic health record. If you see a primary care provider, you can also send messages to your care team and make appointments. If you have questions, please call your primary care clinic.  If you do not have a primary care provider, please call [phone] and they will assist you.      Runner is an electronic gateway that provides easy, online access to your medical records. With Runner, you can request a clinic appointment, read your test results, renew a prescription or communicate with your care team.     To access your existing account, please contact your [facility] or call [phone] for assistance.        Care  "EveryWhere ID     This is your Care EveryWhere ID. This could be used by other organizations to access your [facility] medical records  [id number]        Your Vitals Were     Pulse Temperature Respirations Height Pulse Oximetry BMI (Body Mass Index)    64 97.8  F (36.6  C) (Oral) 16 5' 4.4\" (163.6 cm) 100% 20.94 kg/m2       Blood Pressure from Last 3 Encounters:   05/31/18 134/85   10/11/17 125/80   09/28/17 (!) 167/92    Weight from Last 3 Encounters:   05/31/18 123 lb 8 oz (56 kg)   10/11/17 123 lb (55.8 kg)   09/28/17 122 lb (55.3 kg)              We Performed the Following     Urinalysis, Micro If (UA) ([facility])     Urine Culture Aerobic Bacterial          Today's Medication Changes          These changes are accurate as of 5/31/18 12:03 PM.  If you have any questions, ask your nurse or doctor.               Start taking these medicines.        Dose/Directions    sulfamethoxazole-trimethoprim 400-80 MG per tablet   Commonly known as:  BACTRIM/SEPTRA   Used for:  Acute cystitis with hematuria   Started by:  Lluvia Mejia APRN CNP        Dose:  1 tablet   Take 1 tablet by mouth 2 times daily for 3 days   Quantity:  6 tablet   Refills:  0            Where to get your medicines      These medications were sent to [facility]/pharmacy #[id number] [address] [address]     Phone:  [phone]     sulfamethoxazole-trimethoprim 400-80 MG per tablet                Primary Care Provider Office Phone # Fax #    Maria G Ross [phone]       [address] [place] [address]        Equal Access to Services     MIREYA GAVIN AH: Romario Andrade, elizabet griffin, tara alaniz, archana eldridge. Shama [facility] [phone].    ATENCIÓN: Si habla español, tiene a lewis disposición servicios gratuitos de asistencia lingüística. Llame al [phone].    We comply with applicable federal civil " rights laws and [place] laws. We do not discriminate on the basis of race, color, national origin, age, disability, sex, sexual orientation, or gender identity.            Thank you!     Thank you for choosing [facility]  for your care. Our goal is always to provide you with excellent care. Hearing back from our patients is one way we can continue to improve our services. Please take a few minutes to complete the written survey that you may receive in the mail after your visit with us. Thank you!             Your Updated Medication List - Protect others around you: Learn how to safely use, store and throw away your medicines at www.disposemymeds.org.          This list is accurate as of 5/31/18 12:03 PM.  Always use your most recent med list.                   Brand Name Dispense Instructions for use Diagnosis    atenolol 100 MG tablet    TENORMIN    90 tablet    Take 1 tablet (100 mg) by mouth daily    Essential hypertension       cetirizine 10 MG tablet    zyrTEC    30 tablet    Take 1 tablet (10 mg) by mouth daily        cholecalciferol 1000 units capsule    vitamin  -D     Take 1 capsule by mouth daily        lisinopril 20 MG tablet    PRINIVIL/ZESTRIL    135 tablet    Take 1.5 tablets (30 mg) by mouth daily    Essential hypertension       pravastatin 10 MG tablet    PRAVACHOL    90 tablet    Take 1 tablet (10 mg) by mouth daily    Hyperlipidemia LDL goal <100       red yeast rice 600 MG Caps     60 capsule    daily Take one twice daily        sulfamethoxazole-trimethoprim 400-80 MG per tablet    BACTRIM/SEPTRA    6 tablet    Take 1 tablet by mouth 2 times daily for 3 days    Acute cystitis with hematuria

## 2018-05-31 NOTE — NURSING NOTE
"72 year old  Chief Complaint   Patient presents with     Consult     Pt. presents to the clinic for a possible UTI. Woke up with Painful urination       Blood pressure 134/85, pulse 64, temperature 97.8  F (36.6  C), temperature source Oral, resp. rate 16, height 5' 4.4\" (163.6 cm), weight 123 lb 8 oz (56 kg), SpO2 100 %. Body mass index is 20.94 kg/(m^2).  BP completed using cuff size:    Patient Active Problem List   Diagnosis     Osteoarthritis of knee     Seasonal allergies     Atrophic vaginitis     Essential hypertension with goal blood pressure less than 140/90     Basal cell adenoma     Hyperlipidemia LDL goal <100       Wt Readings from Last 2 Encounters:   05/31/18 123 lb 8 oz (56 kg)   10/11/17 123 lb (55.8 kg)     BP Readings from Last 3 Encounters:   05/31/18 134/85   10/11/17 125/80   09/28/17 (!) 167/92       No Known Allergies    Current Outpatient Prescriptions   Medication     atenolol (TENORMIN) 100 MG tablet     cetirizine (ZYRTEC) 10 MG tablet     cholecalciferol (VITAMIN  -D) 1000 UNITS capsule     pravastatin (PRAVACHOL) 10 MG tablet     lisinopril (PRINIVIL/ZESTRIL) 20 MG tablet     red yeast rice 600 MG CAPS     No current facility-administered medications for this visit.        Social History   Substance Use Topics     Smoking status: Never Smoker     Smokeless tobacco: Never Used     Alcohol use 4.2 oz/week     7 Standard drinks or equivalent per week         Honoring Choices - Health Care Directive Guide offered to patient at time of visit.    Health Maintenance Due   Topic Date Due     DEXA Q5 YR  12/12/1976     COLONOSCOPY Q10 YR  10/01/2016       Immunization History   Administered Date(s) Administered     Influenza (High Dose) 3 valent vaccine 09/09/2016, 09/28/2017     Influenza (IIV3) PF 10/01/2012, 10/01/2013     Pneumo Conj 13-V (2010&after) 09/09/2016     Pneumococcal 23 valent 10/01/2012     Tdap (Adacel,Boostrix) 03/02/2010     Zoster vaccine, live 01/01/2009       No results " found for: PAP      Recent Labs   Lab Test  01/05/18   0930  09/28/17   1046  10/25/16   1547  09/09/16   1052  12/03/15   1446   11/25/14   0917   A1C   --   5.5   --    --    --    --    --    LDL  120*  172.0*   --    --   139*   --   172.0*   HDL  61  85.0   --    --   70   --   71.0   TRIG  91  110.0   --    --   138   --   107.0   ALT  23   --    --    --   19   --   10.0   CR   --   0.7  0.72  0.90  0.84   --   0.9   GFRESTIMATED   --    --   80  62  67   < >   --    GFRESTBLACK   --    --   >90   GFR Calc    74  81   < >   --    ALBUMIN   --    --    --    --   4.3   --   4.1   POTASSIUM   --   5.0  3.7  3.9  4.1   --   4.2   TSH   --    --   0.47   --    --    --    --     < > = values in this interval not displayed.       PHQ-2 ( 1999 Pfizer) 5/31/2018 10/11/2017   Q1: Little interest or pleasure in doing things 0 0   Q2: Feeling down, depressed or hopeless 0 0   PHQ-2 Score 0 0       PHQ-9 SCORE 5/31/2018   Total Score 0       LETICIA-7 SCORE 5/31/2018   Total Score 0       No flowsheet data found.    Tasia Brooks CMA  May 31, 2018 11:17 AM

## 2018-05-31 NOTE — PROGRESS NOTES
"      HPI:       Shana Us is a 72 year old who presents for evaluation of painful urination.     Urinary Tract Symptoms  Shana Us is a 72 year old female who presents with mild a burning sensation with urination that started this morning. This burning is noted just at the very end of urinating. Patient is experiencing frequent urination but says this is normal for her. She also started noticing blood in urine beginning this morning. She denies any delay/urgency in urine. Patient thinks that the burning sensation is progressively worsening. Denies any accompanying symptoms of fever/chills, flank pain, pelvic pain, nausea and vomiting, or vaginal discharge. Patient denies any previous history of UTI before menopause but has had  3-4 UTIs after menopause. She denies any history of kidney stones or falls. Patient is sexually active and has one male partner. She is not sure if anything makes her symptoms worse or better since they just started this morning. \" I haven't had it long enough to know.\" She tried drinking cranberry juice this morning, but noticed no change in the symptoms.    Past Medical History:   Diagnosis Date     Atrophic vaginitis      Basal cell adenoma      Hyperlipidemia      Hypertension      Osteoarthritis      Seasonal allergies      Current Outpatient Prescriptions   Medication     atenolol (TENORMIN) 100 MG tablet     cetirizine (ZYRTEC) 10 MG tablet     cholecalciferol (VITAMIN  -D) 1000 UNITS capsule     pravastatin (PRAVACHOL) 10 MG tablet     sulfamethoxazole-trimethoprim (BACTRIM DS) 800-160 MG per tablet     lisinopril (PRINIVIL/ZESTRIL) 20 MG tablet     red yeast rice 600 MG CAPS     No current facility-administered medications for this visit.       No Known Allergies    Problem, Medication and Allergy Lists were reviewed and are current.  Patient is a new patient to this clinic and so  I reviewed/updated the Past Medical History, the Family History and the Social History.      " "    Review of Systems:   Review of Systems      ROS: 10 point ROS neg other than the symptoms noted above in the HPI.         Physical Exam:   /85 (BP Location: Left arm, Patient Position: Chair, Cuff Size: Adult Regular)  Pulse 64  Temp 97.8  F (36.6  C) (Oral)  Resp 16  Ht 5' 4.4\" (163.6 cm)  Wt 123 lb 8 oz (56 kg)  SpO2 100%  BMI 20.94 kg/m2    Body mass index is 20.94 kg/(m^2).  Vitals were reviewed and were normal.     Physical Exam   Cardiovascular: Normal rate, regular rhythm, normal heart sounds and intact distal pulses.  Exam reveals no gallop and no friction rub.    No murmur heard.  Pulmonary/Chest: Effort normal. No respiratory distress. She has no wheezes. She has no rales. She exhibits no tenderness.   Abdominal: Soft. Bowel sounds are normal. She exhibits no distension and no mass. There is no tenderness. There is no rebound, no guarding and no CVA tenderness.         Results:      Results from the last 24 hours  Results for orders placed or performed in visit on 05/31/18 (from the past 24 hour(s))   Urinalysis, Micro If (UA) ([facility])   Result Value Ref Range    Leukocyte Esterase UR 2+ Negative U/l    Nitrite Urine Negative Negative mg/dL    Protein UR 2+ Negative mg/dL    Glucose Urine Negative Negative mg/dL    Ketones Urine Negative Negative mg/dL    Urobilinogen mg/dL 0.2 E.U./dL 0.2 E.U./dL    Bilirubin UR Negative Negative mg/dL    Blood UR 3+ Negative mg/dL    Specific Gravity Urine 1.010 1.005 - 1.030    pH Urine 7.0 4.5 - 8.0    Color Urine Pink Yellow    Clarity, urine Slightly Cloudy Clear     Laboratory testing pending:  Urine Culture Aerobic Bacterial    Assessment and Plan     (N30.01) Acute cystitis with hematuria  (primary encounter diagnosis)  (R30.0) Dysuria  Comment: Pt with 1 day hx of dysuria and hematuria. S/sx similar to last UTI. No fever or signs of systemic illness on exam. Likely UTI, will begin antimicrobial treatment while awaiting culture and " sensitivity results. Advise returning to clinic in 2 weeks for repeat UA to ensure hematuria is resolved. With the amount of hematuria, ddx includes nephrolithiasis. If symptoms fail to improve or worsen, would consider CT w/o contrast to rule out stones.   Plan:    - Urinalysis, Micro If (UA) ([facility]),    - Urine Culture Aerobic Bacterial   - sulfamethoxazole-trimethoprim (BACTRIM DS) 800-160 MG per tablet   - Counseled on appropriate medication administration   - Counseled on reasons to return to clinic or seek emergency care      Follow-up: Return to clinic if symptoms fail to improve, worsen, or new symptoms develop with the above treatment plan.       Medications Discontinued During This Encounter   Medication Reason     sulfamethoxazole-trimethoprim (BACTRIM/SEPTRA) 400-80 MG per tablet Dose adjustment     Options for treatment and follow-up care were reviewed with the patient. Shana Us  engaged in the decision making process and verbalized understanding of the options discussed and agreed with the final plan.    Obdulia Carrington RN, N Adult/Gerontology NP Student saw and evaluated the above patient along with myself. Note co-authored with student, I agree with the history, physical exam findings, and the assessment and plan.     LIDA De Souza CNP

## 2018-05-31 NOTE — PATIENT INSTRUCTIONS
"1) Will start Bactrim for 3 days.  2) Return to clinic in 2 weeks for another urinalysis to make sure the blood in your urine has resolved.          * BLADDER INFECTION,Female (Adult)    A bladder infection (\"cystitis\" or \"UTI\") usually causes a constant urge to urinate and a burning when passing urine. Urine may be cloudy, smelly or dark. There may be pain in the lower abdomen. A bladder infection occurs when bacteria from the vaginal area enter the bladder opening (urethra). This can occur from sexual intercourse, wearing tight clothing, dehydration and other factors.  HOME CARE:  1. Drink lots of fluids (at least 6-8 glasses a day, unless you must restrict fluids for other medical reasons). This will force the medicine into your urinary system and flush the bacteria out of your body. Cranberry juice has been shown to help clear out the bacteria.  2. Avoid sexual intercourse until your symptoms are gone.  3. A bladder infection is treated with antibiotics. You may also be given Pyridium (generic = phenazopyridine) to reduce the burning sensation. This medicine will cause your urine to become a bright orange color. The orange urine may stain clothing. You may wear a pad or panty-liner to protect clothing.  PREVENTING FUTURE INFECTIONS:  1. Always wipe from front to back after a bowel movement.  2. Keep the genital area clean and dry.  3. Drink plenty of fluids each day to avoid dehydration.  4. Urinate right after intercourse to flush out the bladder.  5. Wear cotton underwear and cotton-lined panty hose; avoid tight-fitting pants.  6. If you are on birth control pills and are having frequent bladder infections, discuss with your doctor.  FOLLOW UP: Return to this facility or see your doctor if ALL symptoms are not gone after three days of treatment.  GET PROMPT MEDICAL ATTENTION if any of the following occur:    Fever over 101 F (38.3 C)    No improvement by the third day of treatment    Increasing back or " abdominal pain    Repeated vomiting; unable to keep medicine down    Weakness, dizziness or fainting    Vaginal discharge    Pain, redness or swelling in the labia (outer vaginal area)    2791-7331 [employer]. [address] [place] [address], [place] [address]. All rights reserved. This information is not intended as a substitute for professional medical care. Always follow your healthcare professional's instructions.  This information has been modified by your health care provider with permission from the publisher.

## 2018-06-01 LAB
BACTERIA SPEC CULT: NO GROWTH
Lab: NORMAL
SPECIMEN SOURCE: NORMAL

## 2018-06-01 ASSESSMENT — PATIENT HEALTH QUESTIONNAIRE - PHQ9: SUM OF ALL RESPONSES TO PHQ QUESTIONS 1-9: 0

## 2018-06-01 ASSESSMENT — ANXIETY QUESTIONNAIRES: GAD7 TOTAL SCORE: 0

## 2018-06-07 ENCOUNTER — TELEPHONE (OUTPATIENT)
Dept: FAMILY MEDICINE | Facility: CLINIC | Age: 73
End: 2018-06-07

## 2018-07-27 DIAGNOSIS — I10 ESSENTIAL HYPERTENSION: ICD-10-CM

## 2018-07-27 RX ORDER — LISINOPRIL 20 MG/1
30 TABLET ORAL DAILY
Qty: 135 TABLET | Refills: 0 | Status: SHIPPED | OUTPATIENT
Start: 2018-07-27 | End: 2018-10-02

## 2018-07-27 NOTE — TELEPHONE ENCOUNTER
Last office visit:  10/11/2017, no future appointment.  Due for visit and labs in 3 months     Prescription approved per [facility] Refill Protocol.      Ame Maradiaga RN  July 27, 2018 3:19 PM

## 2018-08-22 ENCOUNTER — TRANSFERRED RECORDS (OUTPATIENT)
Dept: HEALTH INFORMATION MANAGEMENT | Facility: CLINIC | Age: 73
End: 2018-08-22

## 2018-10-02 ENCOUNTER — OFFICE VISIT (OUTPATIENT)
Dept: FAMILY MEDICINE | Facility: CLINIC | Age: 73
End: 2018-10-02
Payer: COMMERCIAL

## 2018-10-02 VITALS
HEART RATE: 60 BPM | WEIGHT: 124 LBS | SYSTOLIC BLOOD PRESSURE: 93 MMHG | DIASTOLIC BLOOD PRESSURE: 73 MMHG | HEIGHT: 65 IN | TEMPERATURE: 97.4 F | BODY MASS INDEX: 20.66 KG/M2 | OXYGEN SATURATION: 100 %

## 2018-10-02 DIAGNOSIS — E78.5 HYPERLIPIDEMIA LDL GOAL <100: ICD-10-CM

## 2018-10-02 DIAGNOSIS — Z23 FLU VACCINE NEED: ICD-10-CM

## 2018-10-02 DIAGNOSIS — I10 ESSENTIAL HYPERTENSION: ICD-10-CM

## 2018-10-02 DIAGNOSIS — Z12.11 SPECIAL SCREENING FOR MALIGNANT NEOPLASMS, COLON: ICD-10-CM

## 2018-10-02 DIAGNOSIS — Z00.00 ROUTINE GENERAL MEDICAL EXAMINATION AT A HEALTH CARE FACILITY: Primary | ICD-10-CM

## 2018-10-02 LAB
BASOPHILS # BLD AUTO: 0.1 10E9/L (ref 0–0.2)
BASOPHILS NFR BLD AUTO: 0.5 %
BUN SERPL-MCNC: 14 MG/DL (ref 7–30)
CALCIUM SERPL-MCNC: 10 MG/DL (ref 8.5–10.4)
CHLORIDE SERPLBLD-SCNC: 99 MMOL/L (ref 94–109)
CO2 SERPL-SCNC: 28 MMOL/L (ref 20–32)
CREAT SERPL-MCNC: 1 MG/DL (ref 0.6–1.3)
DIFFERENTIAL METHOD BLD: NORMAL
EGFR CALCULATED (BLACK REFERENCE): 70.3
EGFR CALCULATED (NON BLACK REFERENCE): 58.1
EOSINOPHIL # BLD AUTO: 0.2 10E9/L (ref 0–0.7)
EOSINOPHIL NFR BLD AUTO: 2 %
ERYTHROCYTE [DISTWIDTH] IN BLOOD BY AUTOMATED COUNT: 12.6 % (ref 10–15)
GLUCOSE SERPL-MCNC: 96 MG/DL (ref 60–109)
HCT VFR BLD AUTO: 41.6 % (ref 35–47)
HGB BLD-MCNC: 13.6 G/DL (ref 11.7–15.7)
IMM GRANULOCYTES # BLD: 0 10E9/L (ref 0–0.4)
IMM GRANULOCYTES NFR BLD: 0.2 %
LYMPHOCYTES # BLD AUTO: 2.7 10E9/L (ref 0.8–5.3)
LYMPHOCYTES NFR BLD AUTO: 27.2 %
MCH RBC QN AUTO: 31.1 PG (ref 26.5–33)
MCHC RBC AUTO-ENTMCNC: 32.7 G/DL (ref 31.5–36.5)
MCV RBC AUTO: 95 FL (ref 78–100)
MONOCYTES # BLD AUTO: 1 10E9/L (ref 0–1.3)
MONOCYTES NFR BLD AUTO: 9.9 %
NEUTROPHILS # BLD AUTO: 5.9 10E9/L (ref 1.6–8.3)
NEUTROPHILS NFR BLD AUTO: 60.2 %
NRBC # BLD AUTO: 0 10*3/UL
NRBC BLD AUTO-RTO: 0 /100
PLATELET # BLD AUTO: 273 10E9/L (ref 150–450)
POTASSIUM SERPL-SCNC: 5.2 MMOL/L (ref 3.4–5.3)
RBC # BLD AUTO: 4.38 10E12/L (ref 3.8–5.2)
SODIUM SERPL-SCNC: 142 MMOL/L (ref 133–144)
WBC # BLD AUTO: 9.7 10E9/L (ref 4–11)

## 2018-10-02 RX ORDER — LISINOPRIL 20 MG/1
30 TABLET ORAL DAILY
Qty: 135 TABLET | Refills: 3 | Status: SHIPPED | OUTPATIENT
Start: 2018-10-02 | End: 2019-10-08

## 2018-10-02 RX ORDER — ATENOLOL 100 MG/1
100 TABLET ORAL DAILY
Qty: 90 TABLET | Refills: 3 | Status: SHIPPED | OUTPATIENT
Start: 2018-10-02 | End: 2019-09-19

## 2018-10-02 RX ORDER — PRAVASTATIN SODIUM 10 MG
10 TABLET ORAL DAILY
Qty: 90 TABLET | Refills: 3 | Status: SHIPPED | OUTPATIENT
Start: 2018-10-02 | End: 2019-09-19

## 2018-10-02 NOTE — MR AVS SNAPSHOT
After Visit Summary   10/2/2018    Shana Us    MRN: [id number]           Patient Information     Date Of Birth          1945        Visit Information        Provider Department      10/2/2018 3:00 PM Giulia Hairston PA-C [facility]        Today's Diagnoses     Routine general medical examination at a health care facility    -  1    Essential hypertension        Hyperlipidemia LDL goal <100        Special screening for malignant neoplasms, colon        Flu vaccine need          Care Instructions      Preventive Health Recommendations    Female Ages 65 +    Yearly exam:     See your health care provider every year in order to  o Review health changes.   o Discuss preventive care.    o Review your medicines if your doctor has prescribed any.      You no longer need a yearly Pap test unless you've had an abnormal Pap test in the past 10 years. If you have vaginal symptoms, such as bleeding or discharge, be sure to talk with your provider about a Pap test.      Every 1 to 2 years, have a mammogram.  If you are over 69, talk with your health care provider about whether or not you want to continue having screening mammograms.      Every 10 years, have a colonoscopy. Or, have a yearly FIT test (stool test). These exams will check for colon cancer.       Have a cholesterol test every 5 years, or more often if your doctor advises it.       Have a diabetes test (fasting glucose) every three years. If you are at risk for diabetes, you should have this test more often.       At age 65, have a bone density scan (DEXA) to check for osteoporosis (brittle bone disease).    Shots:    Get a flu shot each year.    Get a tetanus shot every 10 years.    Talk to your doctor about your pneumonia vaccines. There are now two you should receive - Pneumovax (PPSV 23) and Prevnar (PCV 13).    Talk to your pharmacist about the shingles vaccine.    Talk to your doctor about the hepatitis B  vaccine.    Nutrition:     Eat at least 5 servings of fruits and vegetables each day.      Eat whole-grain bread, whole-wheat pasta and brown rice instead of white grains and rice.      Get adequate Calcium and Vitamin D.     Lifestyle    Exercise at least 150 minutes a week (30 minutes a day, 5 days a week). This will help you control your weight and prevent disease.      Limit alcohol to one drink per day.      No smoking.       Wear sunscreen to prevent skin cancer.       See your dentist twice a year for an exam and cleaning.      See your eye doctor every 1 to 2 years to screen for conditions such as glaucoma, macular degeneration and cataracts.              Follow-ups after your visit        Who to contact     Please call your clinic at [phone] to:    Ask questions about your health    Make or cancel appointments    Discuss your medicines    Learn about your test results    Speak to your doctor            Additional Information About Your Visit        [employer] Information     [employer] gives you secure access to your electronic health record. If you see a primary care provider, you can also send messages to your care team and make appointments. If you have questions, please call your primary care clinic.  If you do not have a primary care provider, please call [phone] and they will assist you.      [employer] is an electronic gateway that provides easy, online access to your medical records. With [employer], you can request a clinic appointment, read your test results, renew a prescription or communicate with your care team.     To access your existing account, please contact your [facility] or call [phone] for assistance.        Care EveryWhere ID     This is your Care EveryWhere ID. This could be used by other organizations to access your [facility] medical records  [id number]        Your Vitals Were     Pulse Temperature Height Pulse Oximetry BMI (Body Mass Index)        "60 97.4  F (36.3  C) (Oral) 5' 4.8\" (164.6 cm) 100% 20.76 kg/m2        Blood Pressure from Last 3 Encounters:   10/02/18 93/73   05/31/18 134/85   10/11/17 125/80    Weight from Last 3 Encounters:   10/02/18 124 lb (56.2 kg)   05/31/18 123 lb 8 oz (56 kg)   10/11/17 123 lb (55.8 kg)              We Performed the Following     ADMIN INFLUENZA VIRUS VACCINE     Basic Metabolic Panel ([place])     C RIV4 (FLUBLOK) VACCINE RECOMBINANT DNA PRSRV ANTIBIO FREE, IM     CBC with platelets differential          Where to get your medicines      These medications were sent to [facility]/pharmacy #[id number] - [place], [place] - [address] AT [facility].  [address] [facility] [address]     Phone:  [phone]     atenolol 100 MG tablet    lisinopril 20 MG tablet    pravastatin 10 MG tablet          Primary Care Provider Office Phone # Fax #    Maria G Ross [phone]       [address] [place] [address]        Equal Access to Services     PATSY GAVIN AH: Hadjosh tejadao Sonereydaali, waaxda luqadaha, qaybta kaalmada adeegyada, archana scott . So [facility] [phone].    ATENCIÓN: Si habla español, tiene a lewis disposición servicios gratuitos de asistencia lingüística. Mishaame al [phone].    We comply with applicable federal civil rights laws and [place] laws. We do not discriminate on the basis of race, color, national origin, age, disability, sex, sexual orientation, or gender identity.            Thank you!     Thank you for choosing [facility]  for your care. Our goal is always to provide you with excellent care. Hearing back from our patients is one way we can continue to improve our services. Please take a few minutes to complete the written survey that you may receive in the mail after your visit with us. Thank you!             Your Updated Medication List - Protect others around you: Learn how to safely use, " store and throw away your medicines at www.disposemymeds.org.          This list is accurate as of 10/2/18 11:59 PM.  Always use your most recent med list.                   Brand Name Dispense Instructions for use Diagnosis    atenolol 100 MG tablet    TENORMIN    90 tablet    Take 1 tablet (100 mg) by mouth daily    Essential hypertension       cetirizine 10 MG tablet    zyrTEC    30 tablet    Take 1 tablet (10 mg) by mouth daily        cholecalciferol 1000 units capsule    vitamin  -D     Take 1 capsule by mouth daily        lisinopril 20 MG tablet    PRINIVIL/ZESTRIL    135 tablet    Take 1.5 tablets (30 mg) by mouth daily    Essential hypertension       pravastatin 10 MG tablet    PRAVACHOL    90 tablet    Take 1 tablet (10 mg) by mouth daily    Hyperlipidemia LDL goal <100

## 2018-10-02 NOTE — PATIENT INSTRUCTIONS

## 2018-10-02 NOTE — NURSING NOTE
"72 year old  Chief Complaint   Patient presents with     Physical     refill lisinopril.      Imm/Inj     flu shot and wants to discuss shingles vaccine       Blood pressure 93/73, pulse 60, temperature 97.4  F (36.3  C), temperature source Oral, height 5' 4.8\" (164.6 cm), weight 124 lb (56.2 kg), SpO2 100 %. Body mass index is 20.76 kg/(m^2).  Patient Active Problem List   Diagnosis     Osteoarthritis of knee     Seasonal allergies     Atrophic vaginitis     Essential hypertension with goal blood pressure less than 140/90     Basal cell adenoma     Hyperlipidemia LDL goal <100       Wt Readings from Last 2 Encounters:   10/02/18 124 lb (56.2 kg)   05/31/18 123 lb 8 oz (56 kg)     BP Readings from Last 3 Encounters:   10/02/18 93/73   05/31/18 134/85   10/11/17 125/80         Current Outpatient Prescriptions   Medication     atenolol (TENORMIN) 100 MG tablet     cetirizine (ZYRTEC) 10 MG tablet     cholecalciferol (VITAMIN  -D) 1000 UNITS capsule     lisinopril (PRINIVIL/ZESTRIL) 20 MG tablet     pravastatin (PRAVACHOL) 10 MG tablet     No current facility-administered medications for this visit.        Social History   Substance Use Topics     Smoking status: Never Smoker     Smokeless tobacco: Never Used     Alcohol use 4.2 oz/week     7 Standard drinks or equivalent per week       Health Maintenance Due   Topic Date Due     DEXA Q5 YR  12/12/1976     COLONOSCOPY Q10 YR  10/01/2016     INFLUENZA VACCINE (1) 09/01/2018     FALL RISK ASSESSMENT  10/11/2018       No results found for: PAP      October 2, 2018 3:01 PM  "

## 2018-10-02 NOTE — PROGRESS NOTES
SUBJECTIVE:   Shana Us is a 72 year old female who presents for Preventive Visit.  Are you in the first 12 months of your Medicare Part B coverage?  No    Healthy Habits:    Do you get at least three servings of calcium containing foods daily (dairy, green leafy vegetables, etc.)? yes    Amount of exercise or daily activities, outside of work: 5 day(s) per week    Problems taking medications regularly No    Medication side effects: No    Have you had an eye exam in the past two years? yes    Do you see a dentist twice per year? yes    Do you have sleep apnea, excessive snoring or daytime drowsiness?no      Ability to successfully perform activities of daily living: Yes, no assistance needed    Home safety:  none identified     Hearing impairment: No    Fall risk:  Fallen 2 or more times in the past year?: No  Any fall with injury in the past year?: No    Colonoscopy done on this date: 2006 (approximately), results were normal.    Average risk without family history or history of polyps. Discussed Cologard as an option as she is resistant about the colonoscopy.  Mammogram done on this date: 8/22/2018 (approximately), by this group: [facility], results were normal.    Immunizations:  Patient has had besides the fact that she has questions about the Shingrix vaccine.  Tetanus shot is up-to-date and she will be getting the flu shot today.      Hypertension Follow-up: Patient denies symptoms associated with hypertension including headache, blurred vision, chest pain, heart palpitations and pedal edema.  Shana is taking lisinopril 30 mg and atenolol 100 mg daily.     Outpatient blood pressures are being checked at home.  Results are within range.  Less than 120/80    Low Salt Diet: no added salt    Hyperlipidemia Follow-Up  Shana takes pravastatin 10 mg daily.  She started this medication about a year ago and is tolerating well.    Rate your low fat/cholesterol diet?: low fat diet    Taking statin?  Yes,  no muscle aches from statin    Other lipid medications/supplements?:  none    Six Item Cognitive Impairment Test   (6CIT):       What year is it?                                                             Correct - 0 points    What month is it?                                                          Correct - 0 points       Give the patient an address to remember with five components:                        [place]ie[place] ( first and last name - 2 components)                        [place]  (number and name of street - 2                       components)                        [place] ( city - 1 component)       About what time is it (within the hour)?                     Correct - 0 points    Count backwards from 20 to 1:                                    Correct - 0 points    Say the months of the year in reverse:                     Correct - 0 points    Repeat the address phrase:                                         Correct - 0 points     Total 6CIT Score:                                                                                                 0/28     Interpretation: The 6CIT uses an inverse score and questions are weighted to produce a total out of 28. Scores of 0-7 are considered normal and 8 or more significant.     Advantages The test has high sensitivity without compromising specificity even in mild dementia. It is easy to translate linguistically and culturally.  Disadvantages The main disadvantage is in the scoring and weighting of the test, which is initially confusing, however computer models have simplified this greatly.     Probability Statistics: At the 7/8 cut off: Overall figures sensitivity 90% specificity 100%, in mild dementia sensitivity = 78% , specificity = 100%     Copyright 2000 [facility], [facility], [place]. Courtesy of Dr. Addison Aguilera       Reviewed and updated as needed this visit by clinical staff  Tobacco  Allergies  Meds  Problems  Med Hx   Surg Hx  Fam Hx  Soc Hx          Reviewed and updated as needed this visit by Provider  Tobacco  Allergies  Meds  Problems  Med Hx  Surg Hx  Fam Hx  Soc Hx         Social History   Substance Use Topics     Smoking status: Never Smoker     Smokeless tobacco: Never Used     Alcohol use 4.2 oz/week     7 Standard drinks or equivalent per week                           Today's PHQ-2 Score:   PHQ-2 ( 1999 [employer]) 10/2/2018 5/31/2018   Q1: Little interest or pleasure in doing things 0 0   Q2: Feeling down, depressed or hopeless 0 0   PHQ-2 Score 0 0     Do you have a Health Care Directive?: Yes: Advance Directive has been received and scanned.    The following health maintenance items are reviewed in Epic and correct as of today:  Health Maintenance   Topic Date Due     DEXA Q5 YR  12/12/1976     COLONOSCOPY Q10 YR  10/01/2016     FALL RISK ASSESSMENT  10/11/2018     PHQ-2 Q1 YR  10/02/2019     TETANUS IMMUNIZATION (SYSTEM ASSIGNED)  03/02/2020     MAMMO SCREEN Q2 YR (SYSTEM ASSIGNED)  08/22/2020     ADVANCE DIRECTIVE PLANNING Q5 YRS  09/28/2022     LIPID SCREEN Q5 YR FEMALE (SYSTEM ASSIGNED)  01/05/2023     PNEUMOCOCCAL  Completed     INFLUENZA VACCINE  Completed     HEPATITIS C SCREENING  Completed       PAST MEDICAL HISTORY:   Past Medical History:   Diagnosis Date     Atrophic vaginitis      Basal cell adenoma      Hyperlipidemia      Hypertension      Osteoarthritis      Seasonal allergies        PAST SURGICAL HISTORY:   Past Surgical History:   Procedure Laterality Date     BREAST BIOPSY, RT/LT      right breast , benign     COLONOSCOPY  2006     FOOT SURGERY       KNEE SURGERY Right 1994     MOHS MICROGRAPHIC PROCEDURE         FAMILY HISTORY:   Family History   Problem Relation Age of Onset     Breast Cancer Mother 45     High cholesterol Mother      Prostate Cancer Father      High cholesterol Father      Hypertension Father      Hypertension Sister      Hyperlipidemia Sister        SOCIAL HISTORY:  "  Social History   Substance Use Topics     Smoking status: Never Smoker     Smokeless tobacco: Never Used     Alcohol use 4.2 oz/week     7 Standard drinks or equivalent per week     ROS:  CONSTITUTIONAL: NEGATIVE for fever, chills, change in weight  INTEGUMENTARY/SKIN: NEGATIVE for worrisome rashes, moles or lesions  EYES: NEGATIVE for vision changes or irritation  ENT/MOUTH: NEGATIVE for ear, mouth and throat problems  RESP: NEGATIVE for significant cough or SOB  BREAST: NEGATIVE for masses, tenderness or discharge  CV: NEGATIVE for chest pain, palpitations or peripheral edema  GI: NEGATIVE for nausea, abdominal pain, heartburn, or change in bowel habits  : NEGATIVE for frequency, dysuria, or hematuria  MUSCULOSKELETAL: NEGATIVE for significant arthralgias or myalgia  NEURO: NEGATIVE for weakness, dizziness or paresthesias  ENDOCRINE: NEGATIVE for temperature intolerance, skin/hair changes  HEME: NEGATIVE for bleeding problems  PSYCHIATRIC: NEGATIVE for changes in mood or affect    OBJECTIVE:   BP 93/73  Pulse 60  Temp 97.4  F (36.3  C) (Oral)  Ht 5' 4.8\" (164.6 cm)  Wt 124 lb (56.2 kg)  SpO2 100%  BMI 20.76 kg/m2 Estimated body mass index is 20.76 kg/(m^2) as calculated from the following:    Height as of this encounter: 5' 4.8\" (164.6 cm).    Weight as of this encounter: 124 lb (56.2 kg).  EXAM:   GENERAL APPEARANCE: healthy, alert and no distress  EYES: Eyes grossly normal to inspection, PERRL and conjunctivae and sclerae normal  HENT: ear canals and TM's normal, nose and mouth without ulcers or lesions, oropharynx clear and oral mucous membranes moist  NECK: no adenopathy, no asymmetry, masses, or scars and thyroid normal to palpation. No bruits.   RESP: lungs clear to auscultation - no rales, rhonchi or wheezes  BREAST: normal without masses, tenderness or nipple discharge and no palpable axillary masses or adenopathy  CV: regular rate and rhythm, normal S1 S2, no S3 or S4, no murmur, click or rub, " no peripheral edema and peripheral pulses strong  ABDOMEN: soft, nontender, no hepatosplenomegaly, no masses and bowel sounds normal  MS: no musculoskeletal defects are noted and gait is age appropriate without ataxia. no clubbing, edema or cyanosis of extremities. Pulses = and appropriate bilaterally to DP and PT  SKIN: no suspicious lesions or rashes  NEURO: Normal strength and tone, sensory exam grossly normal, mentation intact and speech normal  PSYCH: mentation appears normal and affect normal/bright    ASSESSMENT / PLAN:       ICD-10-CM    1. Routine general medical examination at a health care facility Z00.00    2. Essential hypertension I10 atenolol (TENORMIN) 100 MG tablet     lisinopril (PRINIVIL/ZESTRIL) 20 MG tablet     Basic Metabolic Panel ([place])     CBC with platelets differential   3. Hyperlipidemia LDL goal <100 E78.5 pravastatin (PRAVACHOL) 10 MG tablet   4. Special screening for malignant neoplasms, colon Z12.11    5. Flu vaccine need Z23 C RIV4 (FLUBLOK) VACCINE RECOMBINANT DNA PRSRV ANTIBIO FREE, IM     ADMIN INFLUENZA VIRUS VACCINE      Medications refilled. Continuing healthy lifestyle plenty of exercise and maintain healthy weight. - Diet information reviewed.   Lipids were last done January 2018 continue with heart healthy diet.   Consider taking baby aspirin daily.   Follow-up 12 months sooner if any problems or concerns.   Calcium and vitamin D recommendations discussed regarding prevention of osteoporosis. Weight-bearing exercises encouraged. We discussed DEXA scan which patient declines at this time.  End of Life Planning:  Patient currently has an advanced directive: Yes.  Practitioner is supportive of decision.    COUNSELING:  Reviewed preventive health counseling, as reflected in patient instructions  Special attention given to:       Regular exercise       Healthy diet/nutrition       Vision screening       Hearing screening       Dental care       Immunizations    Vaccinated  "for: Influenza         Osteoporosis Prevention/Bone Health       Colon cancer screening       Advanced Planning     BP Readings from Last 1 Encounters:   10/02/18 93/73     Estimated body mass index is 20.76 kg/(m^2) as calculated from the following:    Height as of this encounter: 5' 4.8\" (164.6 cm).    Weight as of this encounter: 124 lb (56.2 kg).     reports that she has never smoked. She has never used smokeless tobacco.      Counseling Resources:  ATP IV Guidelines  Pooled Cohorts Equation Calculator  Breast Cancer Risk Calculator  FRAX Risk Assessment  ICSI Preventive Guidelines  Dietary Guidelines for Americans, 2010  USDA's MyPlate  ASA Prophylaxis  Lung CA Screening    Giulia Hairston PA-C  [facility]    I, Veena Swan, am serving as a scribe to document services personally performed by Giulia Hairston PA-C, based on data collection and the provider's statements to me.     "

## 2018-10-02 NOTE — NURSING NOTE
"Injectable Influenza Immunization Documentation    1.  Has the patient received the information for the injectable influenza vaccine? YES     2. Is the patient 6 months of age or older? YES     3. Does the patient have any of the following contraindications?         Severe allergy to eggs? No     Severe allergic reaction to previous influenza vaccines? No   Severe allergy to latex? No       History of Guillain-Deerfield syndrome? No     Currently have a temperature greater than 100.4F? No        4.  Severely egg allergic patients should have flu vaccine eligibility assessed by an MD, RN, or pharmacist, and those who received flu vaccine should be observed for 15 min by an MD, RN, Pharmacist, Medical Technician, or member of clinic staff.\": YES    5. Latex-allergic patients should be given latex-free influenza vaccine Yes. Please reference the Vaccine latex table to determine if your clinic s product is latex-containing.       Vaccination given by JENN Nielsen        "

## 2018-12-27 DIAGNOSIS — B37.31 VAGINAL YEAST INFECTION: Primary | ICD-10-CM

## 2018-12-27 RX ORDER — FLUCONAZOLE 150 MG/1
TABLET ORAL
Qty: 1 TABLET | Refills: 0 | Status: SHIPPED | OUTPATIENT
Start: 2018-12-27 | End: 2020-12-08

## 2019-09-18 DIAGNOSIS — I10 ESSENTIAL HYPERTENSION: ICD-10-CM

## 2019-09-18 DIAGNOSIS — E78.5 HYPERLIPIDEMIA LDL GOAL <100: ICD-10-CM

## 2019-09-19 RX ORDER — PRAVASTATIN SODIUM 10 MG
10 TABLET ORAL DAILY
Qty: 90 TABLET | Refills: 0 | Status: SHIPPED | OUTPATIENT
Start: 2019-09-19 | End: 2019-10-08

## 2019-09-19 RX ORDER — ATENOLOL 100 MG/1
100 TABLET ORAL DAILY
Qty: 90 TABLET | Refills: 0 | Status: SHIPPED | OUTPATIENT
Start: 2019-09-19 | End: 2019-10-08

## 2019-10-02 ENCOUNTER — HEALTH MAINTENANCE LETTER (OUTPATIENT)
Age: 74
End: 2019-10-02

## 2019-10-07 NOTE — PATIENT INSTRUCTIONS
Patient Education   Personalized Prevention Plan  You are due for the preventive services outlined below.  Your care team is available to assist you in scheduling these services.  If you have already completed any of these items, please share that information with your care team to update in your medical record.  Health Maintenance Due   Topic Date Due     Osteoporosis Screening  1945     Zoster (Shingles) Vaccine (2 of 3) 02/26/2009     Colonscopy  10/01/2016     PHQ-2  01/01/2019     Flu Vaccine (1) 09/01/2019     FALL RISK ASSESSMENT  10/02/2019     Annual Wellness Visit  10/02/2019        Preventive Health Recommendations    See your health care provider every year to    Review health changes.     Discuss preventive care.      Review your medicines if your doctor has prescribed any.      You no longer need a yearly Pap test unless you've had an abnormal Pap test in the past 10 years. If you have vaginal symptoms, such as bleeding or discharge, be sure to talk with your provider about a Pap test.      Every 1 to 2 years, have a mammogram.  If you are over 69, talk with your health care provider about whether or not you want to continue having screening mammograms.      Every 10 years, have a colonoscopy. Or, have a yearly FIT test (stool test). These exams will check for colon cancer.       Have a cholesterol test every 5 years, or more often if your doctor advises it.       Have a diabetes test (fasting glucose) every three years. If you are at risk for diabetes, you should have this test more often.       At age 65, have a bone density scan (DEXA) to check for osteoporosis (brittle bone disease).    Shots:    Get a flu shot each year.    Get a tetanus shot every 10 years.    Talk to your doctor about your pneumonia vaccines. There are now two you should receive - Pneumovax (PPSV 23) and Prevnar (PCV 13).    Talk to your pharmacist about the shingles vaccine.    Talk to your doctor about the hepatitis B  vaccine.    Nutrition:     Eat at least 5 servings of fruits and vegetables each day.      Eat whole-grain bread, whole-wheat pasta and brown rice instead of white grains and rice.      Get adequate about Calcium and Vitamin D.     Lifestyle    Exercise at least 150 minutes a week (30 minutes a day, 5 days a week). This will help you control your weight and prevent disease.      Limit alcohol to one drink per day.      No smoking.       Wear sunscreen to prevent skin cancer.       See your dentist twice a year for an exam and cleaning.      See your eye doctor every 1 to 2 years to screen for conditions such as glaucoma, macular degeneration, cataracts, etc.    Personalized Prevention Plan  You are due for the preventive services outlined below.  Your care team is available to assist you in scheduling these services.  If you have already completed any of these items, please share that information with your care team to update in your medical record.    Health Maintenance Due   Topic Date Due     Osteoporosis Screening  1945     Zoster (Shingles) Vaccine (2 of 3) 02/26/2009     Colonscopy  10/01/2016     PHQ-2  01/01/2019     Flu Vaccine (1) 09/01/2019     FALL RISK ASSESSMENT  10/02/2019

## 2019-10-08 ENCOUNTER — MEDICAL CORRESPONDENCE (OUTPATIENT)
Dept: HEALTH INFORMATION MANAGEMENT | Facility: CLINIC | Age: 74
End: 2019-10-08

## 2019-10-08 ENCOUNTER — OFFICE VISIT (OUTPATIENT)
Dept: FAMILY MEDICINE | Facility: CLINIC | Age: 74
End: 2019-10-08
Payer: COMMERCIAL

## 2019-10-08 VITALS
HEART RATE: 63 BPM | RESPIRATION RATE: 16 BRPM | DIASTOLIC BLOOD PRESSURE: 83 MMHG | WEIGHT: 124.25 LBS | BODY MASS INDEX: 20.7 KG/M2 | TEMPERATURE: 97.5 F | SYSTOLIC BLOOD PRESSURE: 143 MMHG | OXYGEN SATURATION: 99 % | HEIGHT: 65 IN

## 2019-10-08 DIAGNOSIS — Z13.820 SCREENING FOR OSTEOPOROSIS: ICD-10-CM

## 2019-10-08 DIAGNOSIS — Z12.11 SCREEN FOR COLON CANCER: ICD-10-CM

## 2019-10-08 DIAGNOSIS — E78.5 HYPERLIPIDEMIA LDL GOAL <100: ICD-10-CM

## 2019-10-08 DIAGNOSIS — Z12.31 ENCOUNTER FOR SCREENING MAMMOGRAM FOR BREAST CANCER: ICD-10-CM

## 2019-10-08 DIAGNOSIS — Z00.00 ENCOUNTER FOR MEDICARE ANNUAL WELLNESS EXAM: Primary | ICD-10-CM

## 2019-10-08 DIAGNOSIS — I10 ESSENTIAL HYPERTENSION WITH GOAL BLOOD PRESSURE LESS THAN 140/90: Chronic | ICD-10-CM

## 2019-10-08 DIAGNOSIS — N95.2 ATROPHIC VAGINITIS: ICD-10-CM

## 2019-10-08 DIAGNOSIS — Z23 FLU VACCINE NEED: ICD-10-CM

## 2019-10-08 LAB
ALBUMIN SERPL-MCNC: 4.3 G/DL (ref 3.2–4.5)
ALP SERPL-CCNC: 65 U/L (ref 40–150)
ALT SERPL-CCNC: 24 U/L (ref 0–50)
AST SERPL-CCNC: 42 U/L (ref 0–45)
BILIRUB SERPL-MCNC: 1.1 MG/DL (ref 0.2–1.3)
BUN SERPL-MCNC: 14 MG/DL (ref 7–30)
CALCIUM SERPL-MCNC: 9.9 MG/DL (ref 8.5–10.4)
CHLORIDE SERPLBLD-SCNC: 102 MMOL/L (ref 94–109)
CHOLEST SERPL-MCNC: 226 MG/DL (ref 0–200)
CHOLEST/HDLC SERPL: 2.8 {RATIO} (ref 0–5)
CO2 SERPL-SCNC: 30 MMOL/L (ref 20–32)
CREAT SERPL-MCNC: 0.7 MG/DL (ref 0.6–1.3)
EGFR CALCULATED (BLACK REFERENCE): 105.8
EGFR CALCULATED (NON BLACK REFERENCE): 87.4
FASTING SPECIMEN: NO
GLUCOSE SERPL-MCNC: 97 MG/DL (ref 60–99)
HDLC SERPL-MCNC: 80 MG/DL
LDLC SERPL CALC-MCNC: 126 MG/DL (ref 0–129)
POTASSIUM SERPL-SCNC: 4.2 MMOL/L (ref 3.4–5.3)
PROT SERPL-MCNC: 7.6 G/DL (ref 6.8–8.8)
SODIUM SERPL-SCNC: 143 MMOL/L (ref 137.3–146.3)
TRIGL SERPL-MCNC: 97 MG/DL (ref 0–150)
VLDL-CHOLESTEROL: 19 (ref 7–32)

## 2019-10-08 RX ORDER — ATENOLOL 100 MG/1
100 TABLET ORAL DAILY
Qty: 90 TABLET | Refills: 3 | Status: SHIPPED | OUTPATIENT
Start: 2019-10-08 | End: 2020-10-15

## 2019-10-08 RX ORDER — PRAVASTATIN SODIUM 10 MG
10 TABLET ORAL DAILY
Qty: 90 TABLET | Refills: 3 | Status: SHIPPED | OUTPATIENT
Start: 2019-10-08 | End: 2020-10-15

## 2019-10-08 RX ORDER — LISINOPRIL 20 MG/1
20 TABLET ORAL DAILY
Qty: 90 TABLET | Refills: 3 | Status: SHIPPED | OUTPATIENT
Start: 2019-10-08 | End: 2020-10-15

## 2019-10-08 SDOH — HEALTH STABILITY: MENTAL HEALTH: HOW OFTEN DO YOU HAVE A DRINK CONTAINING ALCOHOL?: 4 OR MORE TIMES A WEEK

## 2019-10-08 SDOH — HEALTH STABILITY: PHYSICAL HEALTH: ON AVERAGE, HOW MANY DAYS PER WEEK DO YOU ENGAGE IN MODERATE TO STRENUOUS EXERCISE (LIKE A BRISK WALK)?: 4 DAYS

## 2019-10-08 SDOH — HEALTH STABILITY: PHYSICAL HEALTH: ON AVERAGE, HOW MANY MINUTES DO YOU ENGAGE IN EXERCISE AT THIS LEVEL?: 50 MIN

## 2019-10-08 SDOH — HEALTH STABILITY: MENTAL HEALTH
STRESS IS WHEN SOMEONE FEELS TENSE, NERVOUS, ANXIOUS, OR CAN'T SLEEP AT NIGHT BECAUSE THEIR MIND IS TROUBLED. HOW STRESSED ARE YOU?: ONLY A LITTLE

## 2019-10-08 SDOH — HEALTH STABILITY: MENTAL HEALTH: HOW MANY STANDARD DRINKS CONTAINING ALCOHOL DO YOU HAVE ON A TYPICAL DAY?: 1 OR 2

## 2019-10-08 SDOH — HEALTH STABILITY: MENTAL HEALTH: HOW OFTEN DO YOU HAVE 6 OR MORE DRINKS ON ONE OCCASION?: NEVER

## 2019-10-08 ASSESSMENT — MIFFLIN-ST. JEOR: SCORE: 1068.85

## 2019-10-08 NOTE — NURSING NOTE
"Injectable Influenza Immunization Documentation    1.  Has the patient received the information for the injectable influenza vaccine? YES     2. Is the patient 6 months of age or older? YES     3. Does the patient have any of the following contraindications?         Severe allergy to eggs? No     Severe allergic reaction to previous influenza vaccines? No   Severe allergy to latex? No       History of Guillain-Sonoita syndrome? No     Currently have a temperature greater than 100.4F? No        4.  Severely egg allergic patients should have flu vaccine eligibility assessed by an MD, RN, or pharmacist, and those who received flu vaccine should be observed for 15 min by an MD, RN, Pharmacist, Medical Technician, or member of clinic staff.\": YES    5. Latex-allergic patients should be given latex-free influenza vaccine Yes. Please reference the Vaccine latex table to determine if your clinic s product is latex-containing.       Vaccination given by JENN Nielsen        "

## 2019-10-08 NOTE — NURSING NOTE
"73 year old  Chief Complaint   Patient presents with     Physical     no concern        Blood pressure (!) 143/83, pulse 63, temperature 97.5  F (36.4  C), temperature source Oral, resp. rate 16, height 1.642 m (5' 4.65\"), weight 56.4 kg (124 lb 4 oz), SpO2 99 %. Body mass index is 20.9 kg/m .  Patient Active Problem List   Diagnosis     Osteoarthritis of knee     Seasonal allergies     Atrophic vaginitis     Essential hypertension with goal blood pressure less than 140/90     Basal cell adenoma     Hyperlipidemia LDL goal <100       Wt Readings from Last 2 Encounters:   10/08/19 56.4 kg (124 lb 4 oz)   10/02/18 56.2 kg (124 lb)     BP Readings from Last 3 Encounters:   10/08/19 (!) 143/83   10/02/18 93/73   05/31/18 134/85         Current Outpatient Medications   Medication     atenolol (TENORMIN) 100 MG tablet     lisinopril (PRINIVIL/ZESTRIL) 20 MG tablet     pravastatin (PRAVACHOL) 10 MG tablet     cetirizine (ZYRTEC) 10 MG tablet     cholecalciferol (VITAMIN  -D) 1000 UNITS capsule     fluconazole (DIFLUCAN) 150 MG tablet     No current facility-administered medications for this visit.        Social History     Tobacco Use     Smoking status: Never Smoker     Smokeless tobacco: Never Used   Substance Use Topics     Alcohol use: Yes     Alcohol/week: 7.0 standard drinks     Types: 7 Standard drinks or equivalent per week     Drug use: No       Health Maintenance Due   Topic Date Due     DEXA  1945     ZOSTER IMMUNIZATION (2 of 3) 02/26/2009     COLONOSCOPY  10/01/2016     PHQ-2  01/01/2019     INFLUENZA VACCINE (1) 09/01/2019     FALL RISK ASSESSMENT  10/02/2019       No results found for: PAP      October 8, 2019 1:09 PM    "

## 2019-10-08 NOTE — PROGRESS NOTES
SUBJECTIVE:   Shana Us is a 73 year old female who presents for Preventive Visit. Last CPE was 8/2018.  Has been doing well. Feels health and stays active.  Healthy diet and exercises frequently by playing tennis and walking.    She has the following issues she would like to discuss: She is in need of labs and medication refills.    Hypertension Follow-up: Shana is taking atenolol 100 mg daily and lisinopril 20 mg daily.  She had been taking lisinopril 30 mg, but was having difficulty splitting the pill in half.  She has occasional difficulty with her vision, which she attributes to her age. Patient denies symptoms associated with high blood pressure including headache, chest pain, heart palpitations, shortness of breath and pedal edema.      Do you check your blood pressure regularly outside of the clinic? Yes - she has a BP machine at home, took BP this morning and read 125/80.    Are you following a low salt diet? No - no added salt    Are your blood pressures ever more than 140 on the top number (systolic) OR more   than 90 on the bottom number (diastolic), for example 140/90? Yes     BP Readings from Last 3 Encounters:   10/08/19 (!) 143/83   10/02/18 93/73   05/31/18 134/85       Hyperlipidemia Follow-Up: She takes pravastatin 10 mg daily, and has been tolerating it well.    Are you having any of the following symptoms? (Select all that apply)  No complaints of shortness of breath, chest pain or pressure.  No increased sweating or nausea with activity.  No left-sided neck or arm pain.  No complaints of pain in calves when walking 1-2 blocks.    Are you regularly taking any medication or supplement to lower your cholesterol?   Yes- pravastatin 10 mg daily    Are you having muscle aches or other side effects that you think could be caused by your cholesterol lowering medication?  No     Vaginal Dryness:  Shana has had increased vaginal dryness and itching.  She originally believed she had a yeast  "infection, but notes sx did not resolve while taking medication.  We discussed options for treatment including topical OTC and estrogen treatments.  She would like to try the estrogen topical/vaginal    Shana has a hx of skin cancer, both basal and squamous cell.  She follows with dermatology regularly, and has a dermatologist in both [place] and [place].  Are you in the first 12 months of your Medicare Part B coverage?  No    Physical Health:    In general, how would you rate your overall physical health? good    Outside of work, how many days during the week do you exercise? Tennis 3-4 times/week    Outside of work, approximately how many minutes a day do you exercise?30-45 minutes    If you drink alcohol do you typically have >3 drinks per day or >7 drinks per week? No    Do you usually eat at least 4 servings of fruit and vegetables a day, include whole grains & fiber and avoid regularly eating high fat or \"junk\" foods? Yes    Do you have any problems taking medications regularly?  No    Do you have any side effects from medications? none    Needs assistance for the following daily activities: no assistance needed    Which of the following safety concerns are present in your home?  none identified     Hearing impairment: No    In the past 6 months, have you been bothered by leaking of urine? No     Mental Health:    In general, how would you rate your overall mental or emotional health? good  PHQ-2 Score:     PHQ-2 ( 1999 [employer]) 10/8/2019 10/2/2018   Q1: Little interest or pleasure in doing things 0 0   Q2: Feeling down, depressed or hopeless 0 0   PHQ-2 Score 0 0       Do you feel safe in your environment? Yes    Do you have a Health Care Directive? Yes: Advance Directive has been received and scanned.    Additional concerns to address?  YES       Fall risk:  Fallen 2 or more times in the past year?: No  Any fall with injury in the past year?: No     Cognitive Screening: Six Item Cognitive Impairment Test   " (6CIT):      What year is it?                               Correct - 0 points    What month is it?                               Correct - 0 points      Give the patient an address to remember with five components:   [place]f Vieira ( first and last name - 2 components)   [address]  (number and name of street - 2 components)   [place] ( city - 1 component)      About what time is it (within the hour)? Correct - 0 points    Count backwards from 20 to 1:   Correct - 0 points    Say the months of the year in reverse: Correct - 0 points    Repeat the address phrase:   Correct - 0 points    Total 6CIT Score:      0/28    Interpretation: The 6CIT uses an inverse score and questions are weighted to produce a total out of 28. Scores of 0-7 are considered normal and 8 or more significant.    Advantages The test has high sensitivity without compromising specificity even in mild dementia. It is easy to translate linguistically and culturally.  Disadvantages The main disadvantage is in the scoring and weighting of the test, which is initially confusing, however computer models have simplified this greatly.    Probability Statistics: At the 7/8 cut off: Overall figures sensitivity 90% specificity 100%, in mild dementia sensitivity = 78% , specificity = 100%    Copyright 2000 [facility], [facility]. Courtesy of Dr. Addison Aguilera      Do you have sleep apnea, excessive snoring or daytime drowsiness?: no      Reviewed and updated as needed this visit by clinical staff  Tobacco  Allergies  Meds  Problems  Med Hx  Surg Hx  Fam Hx  Soc Hx          Reviewed and updated as needed this visit by Provider  Tobacco  Allergies  Meds  Problems  Med Hx  Surg Hx  Fam Hx  Soc Hx         Social History     Socioeconomic History     Marital status:      Spouse name: None     Number of children: 3     Years of education: None     Highest education level: None   Occupational History     Occupation:  retired   Social Needs     Financial resource strain: None     Food insecurity:     Worry: None     Inability: None     Transportation needs:     Medical: None     Non-medical: None   Tobacco Use     Smoking status: Never Smoker     Smokeless tobacco: Never Used   Substance and Sexual Activity     Alcohol use: Yes     Alcohol/week: 7.0 standard drinks     Types: 7 Standard drinks or equivalent per week     Frequency: 4 or more times a week     Drinks per session: 1 or 2     Binge frequency: Never     Drug use: No     Sexual activity: Yes     Partners: Male   Lifestyle     Physical activity:     Days per week: 4 days     Minutes per session: 50 min     Stress: Only a little                                                                                   Social History Narrative    Lives with .  Half the year in [place] and half in [place]. Three children and 5 grandchildren.     Has a good support system.    Feels safe in all environments.    Wears seatbelt 100% of the time    Wears helmet while biking.    Denies history of abuse, past or present, physical, sexual or emotional.        10/08/19    Nicole Hairston PA-C                                   Current providers sharing in care for this patient include:  Patient Care Team:  Giulia Hairston PA-C as PCP - General (Family Practice)  Poli Pearson MD as MD (Family Practice)    The following health maintenance items are reviewed in Epic and correct as of today:  Health Maintenance   Topic Date Due     DEXA  1945     ZOSTER IMMUNIZATION (2 of 3) 02/26/2009     COLONOSCOPY  10/01/2016     DTAP/TDAP/TD IMMUNIZATION (2 - Td) 03/02/2020     MAMMO SCREENING  08/22/2020     MEDICARE ANNUAL WELLNESS VISIT  10/08/2020     FALL RISK ASSESSMENT  10/08/2020     ADVANCE CARE PLANNING  09/28/2022     LIPID  10/08/2024     HEPATITIS C SCREENING  Completed     PHQ-2  Completed     INFLUENZA VACCINE  Completed     PNEUMOCOCCAL IMMUNIZATION 65+ LOW/MEDIUM RISK   "Completed     IPV IMMUNIZATION  Aged Out     MENINGITIS IMMUNIZATION  Aged Out       ROS:  Constitutional, HEENT, cardiovascular, pulmonary, gi and gu systems are negative, except as otherwise noted.    OBJECTIVE:   BP (!) 143/83 (BP Location: Right arm, Patient Position: Sitting, Cuff Size: Adult Regular)   Pulse 63   Temp 97.5  F (36.4  C) (Oral)   Resp 16   Ht 1.642 m (5' 4.65\")   Wt 56.4 kg (124 lb 4 oz)   SpO2 99%   BMI 20.90 kg/m   Estimated body mass index is 20.9 kg/m  as calculated from the following:    Height as of this encounter: 1.642 m (5' 4.65\").    Weight as of this encounter: 56.4 kg (124 lb 4 oz).  EXAM:   GENERAL APPEARANCE: healthy, alert and no distress  EYES: Eyes grossly normal to inspection, PERRL and conjunctivae and sclerae normal  HENT: ear canals and TM's normal, nose and mouth without ulcers or lesions, oropharynx clear and oral mucous membranes moist  NECK: no adenopathy, no asymmetry, masses, or scars and thyroid normal to palpation.  No bruits.  RESP: lungs clear to auscultation - no rales, rhonchi or wheezes  BREAST: normal without masses, tenderness or nipple discharge and no palpable axillary masses or adenopathy  CV: regular rate and rhythm, normal S1 S2, no S3 or S4, no murmur, click or rub, no peripheral edema and peripheral pulses strong  ABDOMEN: soft, nontender, no hepatosplenomegaly, no masses and bowel sounds normal   (female): normal female external genitalia, normal urethral meatus, vaginal mucosal atrophy noted, normal cervix, adnexae, and uterus without masses or abnormal discharge  MS: no musculoskeletal defects are noted and gait is age appropriate without ataxia. no clubbing, edema or cyanosis of extremities. Pulses = and appropriate bilaterally to DP and PT  SKIN: no suspicious lesions or rashes  NEURO: Normal strength and tone, sensory exam grossly normal, mentation intact and speech normal  PSYCH: mentation appears normal and affect " "normal/bright        ASSESSMENT / PLAN:      Diagnosis Comments   1. Encounter for Medicare annual wellness exam  Dexa hip/pelvis/spine*, Mammogram - routine screening    2. Essential hypertension with goal blood pressure less than 140/90  lisinopril (PRINIVIL/ZESTRIL) 20 MG tablet, atenolol (TENORMIN) 100 MG tablet, Comprehensive Metabolic Panel ([place])   Poor control today.  DASH diet  Check readings at home.  Make appointment if BP readings are consistently above 130/85  Continue with regular exercise   3. Hyperlipidemia LDL goal <100  Lipid Panel ([facility]), pravastatin (PRAVACHOL) 10 MG tablet, Comprehensive Metabolic Panel ([place])    4. Atrophic vaginitis  COMPOUNDED NON-CONTROLLED SUBSTANCE (CMPD RX) - PHARMACY TO MIX COMPOUNDED MEDICATION   Estrogen cream   5. Screen for colon cancer  Cologard Rx signed and faxed   6. Screening for osteoporosis  Dexa hip/pelvis/spine*    7. Encounter for screening mammogram for breast cancer  Mammogram ordered   8. Flu vaccine need  C RIV4 (FLUBLOK) VACCINE RECOMBINANT DNA PRSRV ANTIBIO FREE, IM, ADMIN INFLUENZA VIRUS VACCINE mammogram ordered       End of Life Planning:  Patient currently has an advanced directive: Yes.  Practitioner is supportive of decision.    COUNSELING:  Reviewed preventive health counseling, as reflected in patient instructions  Special attention given to:       Regular exercise       Healthy diet/nutrition       Vision screening       Hearing screening       Bladder control       Fall risk prevention       Immunizations    Reviewed.       Alcohol Use       Osteoporosis Prevention/Bone Health       Colon cancer screening       Advanced Planning     Estimated body mass index is 20.9 kg/m  as calculated from the following:    Height as of this encounter: 1.642 m (5' 4.65\").    Weight as of this encounter: 56.4 kg (124 lb 4 oz).     reports that she has never smoked. She has never used smokeless tobacco.      Appropriate preventive services were " discussed with this patient, including applicable screening as appropriate for cardiovascular disease, diabetes, osteopenia/osteoporosis, and glaucoma.  As appropriate for age/gender, discussed screening for colorectal cancer, prostate cancer, breast cancer, and cervical cancer. Checklist reviewing preventive services available has been given to the patient.    Reviewed patients plan of care and provided an AVS. The Basic Care Plan (routine screening as documented in Health Maintenance) for Shana meets the Care Plan requirement. This Care Plan has been established and reviewed with the Patient.    Counseling Resources:  ATP IV Guidelines  Pooled Cohorts Equation Calculator  Breast Cancer Risk Calculator  FRAX Risk Assessment  ICSI Preventive Guidelines  Dietary Guidelines for Americans, 2010  [employer] MyPlate  ASA Prophylaxis  Lung CA Screening    Giulia Hairston PA-C  [facility]    I, Celso Lee, am serving as a scribe to document services personally performed by Giulia Hairston PA-C, based on data collection and the provider's statements to me. Giulia Hairston PA-C, has reviewed, edited, and approv

## 2020-01-07 ENCOUNTER — TRANSFERRED RECORDS (OUTPATIENT)
Dept: HEALTH INFORMATION MANAGEMENT | Facility: CLINIC | Age: 75
End: 2020-01-07

## 2020-01-07 LAB — COLOGUARD-ABSTRACT: NEGATIVE

## 2020-10-13 DIAGNOSIS — E78.5 HYPERLIPIDEMIA LDL GOAL <100: ICD-10-CM

## 2020-10-13 DIAGNOSIS — I10 ESSENTIAL HYPERTENSION WITH GOAL BLOOD PRESSURE LESS THAN 140/90: Chronic | ICD-10-CM

## 2020-10-13 NOTE — TELEPHONE ENCOUNTER
Atenolol  Last office visit: 10/13/20  Next appointment: 11/24/20  Medication last refilled: 10/8/19 #90+3RF    Pravastatin  Last office visit: 10/13/20  Next appointment: 11/24/20  Medication last refilled: 10/8/19 #90+3RF    Lisinopril  Last office visit: 10/13/20  Next appointment: 11/24/20  Medication last refilled: 10/8/19 #90+3RF    Mag    Medication is being filled for 1 time refill only due to:  Patient needs to be seen because it has been more than one year since last visit. Must keep upcoming appointment.     Rhea Naylor RN  10/15/20  4:47 PM

## 2020-10-15 RX ORDER — PRAVASTATIN SODIUM 10 MG
10 TABLET ORAL DAILY
Qty: 30 TABLET | Refills: 0 | Status: SHIPPED | OUTPATIENT
Start: 2020-10-15 | End: 2020-11-11

## 2020-10-15 RX ORDER — LISINOPRIL 20 MG/1
20 TABLET ORAL DAILY
Qty: 30 TABLET | Refills: 0 | Status: SHIPPED | OUTPATIENT
Start: 2020-10-15 | End: 2020-11-11

## 2020-10-15 RX ORDER — ATENOLOL 100 MG/1
100 TABLET ORAL DAILY
Qty: 30 TABLET | Refills: 0 | Status: SHIPPED | OUTPATIENT
Start: 2020-10-15 | End: 2020-11-11

## 2020-11-09 DIAGNOSIS — E78.5 HYPERLIPIDEMIA LDL GOAL <100: ICD-10-CM

## 2020-11-09 DIAGNOSIS — I10 ESSENTIAL HYPERTENSION WITH GOAL BLOOD PRESSURE LESS THAN 140/90: Chronic | ICD-10-CM

## 2020-11-09 NOTE — TELEPHONE ENCOUNTER
Atenolol: Last time prescribed: 10/15/2020 , 30 tabs x 0 refills  Pravastatin: Last time prescribed: 10/15/2020 , 30 tabs x 0 refills  Lisinopril: Last time prescribed: 10/15/2020 , 30 tabs x 0 refills    Last office visit: 10/08/19  Next appointment: 11/24/2020    Medication is being filled for 1 time refill only due to:  Patient needs labs bmp, lipid panel. Future labs ordered no, as has appt in 2 weeks with PCP. Patient needs to be seen because needs BP follow up and lab work .  Has been a year since seen.     Pt has an appt as above   Refill I month to get to appt       Ame Maradiaga RN  November 11, 2020 10:32 AM

## 2020-11-11 RX ORDER — PRAVASTATIN SODIUM 10 MG
10 TABLET ORAL DAILY
Qty: 30 TABLET | Refills: 0 | Status: SHIPPED | OUTPATIENT
Start: 2020-11-11 | End: 2020-12-08

## 2020-11-11 RX ORDER — LISINOPRIL 20 MG/1
20 TABLET ORAL DAILY
Qty: 30 TABLET | Refills: 0 | Status: SHIPPED | OUTPATIENT
Start: 2020-11-11 | End: 2020-12-08

## 2020-11-11 RX ORDER — ATENOLOL 100 MG/1
100 TABLET ORAL DAILY
Qty: 30 TABLET | Refills: 0 | Status: SHIPPED | OUTPATIENT
Start: 2020-11-11 | End: 2020-12-08

## 2020-11-11 NOTE — TELEPHONE ENCOUNTER
[facility] Call Center    Phone Message    May a detailed message be left on voicemail: yes     Reason for Call: Medication Refill Request    Has the patient contacted the pharmacy for the refill? Yes   Name of medication being requested:   Atenolol: Last time prescribed: 10/15/2020 , 30 tabs x 0 refills  Pravastatin: Last time prescribed: 10/15/2020 , 30 tabs x 0 refills  Lisinopril: Last time prescribed: 10/15/2020 , 30 tabs x 0 refills  Provider who prescribed the medication: Nicole Hairston   Pharmacy: [facility]/PHARMACY #[id number] [place], [place] - [address] [facility] AT [facility] & [address]    Date medication is needed: ASAP - Patient is not sure if she will be coming back home in time for her appointment 11/24 because of COVID. Patient was wondering if she could have her prescriptions sent to [place]. Please call patient if there's any questions.          Action Taken: Message routed to:  [facility]    Travel Screening: Not Applicable       Saw this message after sent one month supply to local [facility] thinking pt would be here in a couple of weeks.   Called pt to hear her plan regarding travel, and when she thinks will be back to [place].  Can not do virtual visit as will be out of state. Once hear back , will confer with he provider.     Ame Maradiaga RN  November 11, 2020 10:54 AM    Pt called back , and she states they are pretty certain they  now are coming back for the appt.  She will call her [facility] to have them transfer the scripts from [facility].    Ame Maradiaga RN  November 11, 2020 11:11 AM

## 2020-12-04 DIAGNOSIS — E78.5 HYPERLIPIDEMIA LDL GOAL <100: ICD-10-CM

## 2020-12-04 DIAGNOSIS — I10 ESSENTIAL HYPERTENSION WITH GOAL BLOOD PRESSURE LESS THAN 140/90: Chronic | ICD-10-CM

## 2020-12-04 NOTE — TELEPHONE ENCOUNTER
Atenolol Last time prescribed: 11/11/20 , 30 tabs/caps x 0 refills  Lisinopril Last time prescribed: 11/11/20 , 30 tabs/caps x 0 refills  Pravastatin Last time prescribed: 11/11/20 , 30 tabs/caps x 0 refills  Last office visit: 10/8/19  Next appointment: 12/15/20      Medication is being filled for 1 time refill only due to:  Patient needs to be seen because it has been more than one year since last visit. Keep upcoming appointment     Rhea Naylor RN  12/08/20  10:33 AM

## 2020-12-08 RX ORDER — LISINOPRIL 20 MG/1
20 TABLET ORAL DAILY
Qty: 15 TABLET | Refills: 0 | Status: SHIPPED | OUTPATIENT
Start: 2020-12-08 | End: 2020-12-15

## 2020-12-08 RX ORDER — PRAVASTATIN SODIUM 10 MG
10 TABLET ORAL DAILY
Qty: 15 TABLET | Refills: 0 | Status: SHIPPED | OUTPATIENT
Start: 2020-12-08 | End: 2020-12-15

## 2020-12-08 RX ORDER — ATENOLOL 100 MG/1
100 TABLET ORAL DAILY
Qty: 15 TABLET | Refills: 0 | Status: SHIPPED | OUTPATIENT
Start: 2020-12-08 | End: 2020-12-15

## 2020-12-11 NOTE — PROGRESS NOTES
"  SUBJECTIVE:   Shana Us is a 74 year old female who presents for Preventive Visit/Medicare Wellness. Generally very healthy.        Physical Health:    In general, how would you rate your overall physical health? excellent    Outside of work, how many days during the week do you exercise? 4-5 days/week    Outside of work, approximately how many minutes a day do you exercise?45-60 minutes    If you drink alcohol do you typically have >3 drinks per day or >7 drinks per week? No    Do you usually eat at least 4 servings of fruit and vegetables a day, include whole grains & fiber and avoid regularly eating high fat or \"junk\" foods? Yes    Do you have any problems taking medications regularly?  No    Do you have any side effects from medications? none    Needs assistance for the following daily activities: no assistance needed    Which of the following safety concerns are present in your home?  none identified     Hearing impairment: No    In the past 6 months, have you been bothered by leaking of urine? No    History of basal and squamous cell skin cancer.    Mental Health:    In general, how would you rate your overall mental or emotional health? excellent    PHQ-2 (  [employer]) 12/15/2020 10/8/2019   Q1: Little interest or pleasure in doing things 0 0   Q2: Feeling down, depressed or hopeless 0 0   PHQ-2 Score 0 0       Do you feel safe in your environment? Yes    Have you ever done Advance Care Planning? (For example, a Health Directive, POLST, or a discussion with a medical provider or your loved ones about your wishes): Yes, advance care planning is on file.    Fall risk:  Fallen 2 or more times in the past year?: No  Any fall with injury in the past year?: No  click delete button to remove this line now  Cognitive Screenin) Repeat 3 items (Leader, Season, Table)     2) Clock draw: NORMAL  3) 3 item recall: Recalls 1 object   Results: NORMAL clock, 1-2 items recalled: COGNITIVE IMPAIRMENT LESS " LIKELY    Mini-CogTM Copyright VAISHALI Foss. Licensed by the author for use in [facility]; reprinted with permission (edwar@[facility]). All rights reserved.      Do you have sleep apnea, excessive snoring or daytime drowsiness?: no    Additional concerns to address?  YES--due for follow up and labs for chronic conditions.    Hyperlipidemia Follow-Up    Are you regularly taking any medication or supplement to lower your cholesterol?   Yes    Are you having muscle aches or other side effects that you think could be caused by your cholesterol lowering medication?  No  Recent Labs   Lab Test 10/08/19  1330 01/05/18  0930 09/28/17  1046   CHOL 226.0* 199 279.0*   HDL 80.0 61 85.0   .0 120* 172.0*   TRIG 97.0 91 110.0   CHOLHDLRATIO 2.8  --  3.3       Hypertension Follow-up: Patient denies symptoms associated with high blood pressure including blurred vision, headache, chest pain, heart palpitations, shortness of breath and pedal edema.    Do you check your blood pressure regularly outside of the clinic? No     Are you following a low salt diet? Yes    Are your blood pressures ever more than 140 on the top number (systolic) OR more   than 90 on the bottom number (diastolic), for example 140/90? Yes  BP Readings from Last 6 Encounters:   12/15/20 (!) 146/91   10/08/19 (!) 143/83   10/02/18 93/73   05/31/18 134/85   10/11/17 125/80   09/28/17 (!) 167/92     The 10-year ASCVD risk score (Zurichamy PRASAD Jr., et al., 2013) is: 24.2%*    Values used to calculate the score:      Age: 75 years      Sex: Female      Is Non- : No      Diabetic: No      Tobacco smoker: No      Systolic Blood Pressure: 146 mmHg      Is BP treated: Yes      HDL Cholesterol: 77 mg/dL*      Total Cholesterol: 221 mg/dL*      * - Cholesterol units were assumed for this score calculation  Wt Readings from Last 10 Encounters:   12/15/20 56.2 kg (124 lb)   10/08/19 56.4 kg (124 lb 4 oz)   10/02/18 56.2 kg (124 lb)   05/31/18 56  kg (123 lb 8 oz)   10/11/17 55.8 kg (123 lb)   09/28/17 55.3 kg (122 lb)   10/25/16 52.2 kg (115 lb)   09/09/16 54.9 kg (121 lb 1.9 oz)   05/09/16 55.8 kg (123 lb 1.9 oz)   12/03/15 53.5 kg (118 lb)     Patient Active Problem List    Diagnosis Date Noted     Hyperlipidemia LDL goal <100 10/10/2017     Priority: Medium     Basal cell adenoma 09/28/2017     Priority: Medium     Three occurrences.  Under surveillance with dermatology Q6 months in [place].       Essential hypertension with goal blood pressure less than 140/90 09/09/2016     Priority: Medium     Osteoarthritis of knee 10/01/2013     Priority: Medium     R>L       Seasonal allergies 10/01/2013     Priority: Medium     Nonallergic rhinitis, uses saline wash       Atrophic vaginitis 10/01/2013     Priority: Medium       Past Medical History:   Diagnosis Date     Atrophic vaginitis      Basal cell adenoma      Hyperlipidemia      Hypertension      Osteoarthritis      Seasonal allergies        Past Surgical History:   Procedure Laterality Date     BREAST BIOPSY, RT/LT      right breast , benign     COLONOSCOPY  2006     FOOT SURGERY       KNEE SURGERY Right 1994     MOHS MICROGRAPHIC PROCEDURE         Family History   Problem Relation Age of Onset     Breast Cancer Mother 45     High cholesterol Mother      Prostate Cancer Father      High cholesterol Father      Hypertension Father      Hypertension Sister      Hyperlipidemia Sister        Social History     Tobacco Use     Smoking status: Never Smoker     Smokeless tobacco: Never Used   Substance Use Topics     Alcohol use: Yes     Alcohol/week: 7.0 standard drinks     Types: 7 Standard drinks or equivalent per week     Frequency: 4 or more times a week     Drinks per session: 1 or 2     Binge frequency: Never       Social History     Social History Narrative    Lives with .  Half the year in [place] and half in [place]. Three children and 5 grandchildren.     Has a good support system.    Feels safe in  all environments.    Wears seatbelt 100% of the time    Wears helmet while biking.    Denies history of abuse, past or present, physical, sexual or emotional.        10/08/19    Nicole Hairston PA-C        Updated 12/15/20    No changes. Nicole Hairston PA-C                   Current Outpatient Medications   Medication Sig Dispense Refill     atenolol (TENORMIN) 100 MG tablet Take 1 tablet (100 mg) by mouth daily 90 tablet 3     cetirizine (ZYRTEC) 10 MG tablet Take 1 tablet (10 mg) by mouth daily 30 tablet      lisinopril (ZESTRIL) 30 MG tablet Take 1 tablet (30 mg) by mouth daily 90 tablet 3     pravastatin (PRAVACHOL) 10 MG tablet Take 1 tablet (10 mg) by mouth daily 90 tablet 3     cholecalciferol (VITAMIN  -D) 1000 UNITS capsule Take 1 capsule by mouth daily        COMPOUNDED NON-CONTROLLED SUBSTANCE (CMPD RX) - PHARMACY TO MIX COMPOUNDED MEDICATION Estradiol 0.02% in HRT cream Apply 2 grams,  intravaginally and small amount externally daily for one week then twice weekly for maintenance. (Patient not taking: Reported on 12/15/2020) 30 g 3         Reviewed and updated as needed this visit by clinical staff  Tobacco  Allergies  Meds  Problems  Med Hx  Surg Hx  Fam Hx  Soc Hx          Reviewed and updated as needed this visit by Provider  Tobacco  Allergies  Meds  Problems  Med Hx  Surg Hx  Fam Hx  Soc Hx                               Current providers sharing in care for this patient include:   Patient Care Team:  Giulia Hairston PA-C as PCP - General (Family Practice)  Poli Pearson MD as MD (Family Practice)    The following health maintenance items are reviewed in Epic and correct as of today:  Health Maintenance   Topic Date Due     DEXA  12/12/1963     ZOSTER IMMUNIZATION (2 of 3) 02/26/2009     MAMMO SCREENING  08/22/2020     MEDICARE ANNUAL WELLNESS VISIT  12/15/2021     FALL RISK ASSESSMENT  12/15/2021     LIPID  12/15/2025     ADVANCE CARE PLANNING  12/16/2025     COLORECTAL CANCER  "SCREENING  01/07/2030     DTAP/TDAP/TD IMMUNIZATION (3 - Td) 04/02/2030     HEPATITIS C SCREENING  Completed     PHQ-2  Completed     INFLUENZA VACCINE  Completed     Pneumococcal Vaccine: 65+ Years  Completed     Pneumococcal Vaccine: Pediatrics (0 to 5 Years) and At-Risk Patients (6 to 64 Years)  Aged Out     IPV IMMUNIZATION  Aged Out     MENINGITIS IMMUNIZATION  Aged Out     HEPATITIS B IMMUNIZATION  Aged Out       Pneumonia Vaccine:Up to date  Mammogram Screening: Mammogram Screening: Patient over age 50, mutual decision to screen reflected in health maintenance.    ROS:  CONSTITUTIONAL: NEGATIVE for fever, chills, change in weight  INTEGUMENTARY/SKIN: NEGATIVE for worrisome rashes, moles or lesions  EYES: NEGATIVE for vision changes or irritation  ENT/MOUTH: NEGATIVE for ear, mouth and throat problems  RESP: NEGATIVE for significant cough or SOB  BREAST: NEGATIVE for masses, tenderness or discharge  CV: NEGATIVE for chest pain, palpitations or peripheral edema  GI: NEGATIVE for nausea, abdominal pain, heartburn, or change in bowel habits  : NEGATIVE for frequency, dysuria, or hematuria  MUSCULOSKELETAL: NEGATIVE for significant arthralgias or myalgia  NEURO: NEGATIVE for weakness, dizziness or paresthesias  ENDOCRINE: NEGATIVE for temperature intolerance, skin/hair changes  HEME: NEGATIVE for bleeding problems  PSYCHIATRIC: NEGATIVE for changes in mood or affect    OBJECTIVE:   BP (!) 146/91   Pulse 59   Temp 96.6  F (35.9  C)   Resp 13   Ht 1.648 m (5' 4.88\")   Wt 56.2 kg (124 lb)   SpO2 100%   BMI 20.71 kg/m   Estimated body mass index is 20.71 kg/m  as calculated from the following:    Height as of this encounter: 1.648 m (5' 4.88\").    Weight as of this encounter: 56.2 kg (124 lb).  EXAM:   GENERAL APPEARANCE: healthy, alert and no distress  EYES: Eyes grossly normal to inspection, PERRL and conjunctivae and sclerae normal  HENT: ear canals and TM's normal, nose and mouth without ulcers or " lesions, oropharynx clear and oral mucous membranes moist  NECK: no adenopathy, no asymmetry, masses, or scars and thyroid normal to palpation. No bruits  RESP: lungs clear to auscultation - no rales, rhonchi or wheezes  CV: regular rate and rhythm, normal S1 S2, no S3 or S4, no murmur, click or rub, no peripheral edema and peripheral pulses strong  ABDOMEN: soft, nontender, no hepatosplenomegaly, no masses and bowel sounds normal  MS: no musculoskeletal defects are noted and gait is age appropriate without ataxia. no clubbing, edema or cyanosis of extremities.  Pulses = and appropriate bilaterally to DP and PT  SKIN: no suspicious lesions or rashes  NEURO: Normal strength and tone, sensory exam grossly normal, mentation intact and speech normal  PSYCH: mentation appears normal and affect normal/bright      ASSESSMENT / PLAN:      Diagnosis Comments   1. Encounter for Medicare annual wellness exam     2. Essential hypertension with goal blood pressure less than 140/90  CBC with Diff Plt ([facility]), Comprehensive metabolic panel, atenolol (TENORMIN) 100 MG tablet, lisinopril (ZESTRIL) 30 MG tablet   Increase lisinopril from 20 mg to 30 mg daily.  Please send me your BP readings in 2 weeks.  Check 2 time weekly.   3. Hyperlipidemia LDL goal <100  Lipid Panel ([facility]), Comprehensive metabolic panel, pravastatin (PRAVACHOL) 10 MG tablet              4. Encounter for screening mammogram for breast cancer  Mammogram - routine screening        COUNSELING:  Reviewed preventive health counseling, as reflected in patient instructions  Special attention given to:       Regular exercise       Healthy diet/nutrition       Vision screening       Hearing screening       Dental care       Fall risk prevention       Immunizations    Vaccinated for shingrix recommended.       Osteoporosis Prevention/Bone Health       Colon cancer screening       Advanced Planning     Estimated body mass index is 20.71 kg/m  as calculated from the  "following:    Height as of this encounter: 1.648 m (5' 4.88\").    Weight as of this encounter: 56.2 kg (124 lb).        She reports that she has never smoked. She has never used smokeless tobacco.    Appropriate preventive services were discussed with this patient, including applicable screening as appropriate for cardiovascular disease, diabetes, osteopenia/osteoporosis, and glaucoma.  As appropriate for age/gender, discussed screening for colorectal cancer, prostate cancer, breast cancer, and cervical cancer. Checklist reviewing preventive services available has been given to the patient.    Reviewed patients plan of care and provided an AVS. The Basic Care Plan (routine screening as documented in Health Maintenance) for Shana meets the Care Plan requirement. This Care Plan has been established and reviewed with the Patient.    Counseling Resources:  ATP IV Guidelines  Pooled Cohorts Equation Calculator  Breast Cancer Risk Calculator  BRCA-Related Cancer Risk Assessment: FHS-7 Tool  FRAX Risk Assessment  ICSI Preventive Guidelines  Dietary Guidelines for Americans, 2010  USDA's MyPlate  ASA Prophylaxis  Lung CA Screening    Giulia Hairston PA-C  [facility]  "

## 2020-12-11 NOTE — PATIENT INSTRUCTIONS
Patient Education   Personalized Prevention Plan  You are due for the preventive services outlined below.  Your care team is available to assist you in scheduling these services.  If you have already completed any of these items, please share that information with your care team to update in your medical record.  Health Maintenance Due   Topic Date Due     Osteoporosis Screening  12/12/1963     Zoster (Shingles) Vaccine (2 of 3) 02/26/2009     PHQ-2  01/01/2020     Diptheria Tetanus Pertussis (DTAP/TDAP/TD) Vaccine (2 - Td) 03/02/2020     Mammogram  08/22/2020     FALL RISK ASSESSMENT  10/08/2020     Preventive Health Recommendations    See your health care provider every year to    Review health changes.     Discuss preventive care.      Review your medicines if your doctor has prescribed any.    You no longer need a yearly Pap test unless you've had an abnormal Pap test in the past 10 years. If you have vaginal symptoms, such as bleeding or discharge, be sure to talk with your provider about a Pap test.    Every 1 to 2 years, have a mammogram.  If you are over 69, talk with your health care provider about whether or not you want to continue having screening mammograms.    Every 10 years, have a colonoscopy. Or, have a yearly FIT test (stool test). These exams will check for colon cancer.     Have a cholesterol test every 5 years, or more often if your doctor advises it.     Have a diabetes test (fasting glucose) every three years. If you are at risk for diabetes, you should have this test more often.     At age 65, have a bone density scan (DEXA) to check for osteoporosis (brittle bone disease).    Shots:    Get a flu shot each year.    Get a tetanus shot every 10 years.    Talk to your doctor about your pneumonia vaccines. There are now two you should receive - Pneumovax (PPSV 23) and Prevnar (PCV 13).    Talk to your pharmacist about the shingles vaccine.    Talk to your doctor about the hepatitis B  vaccine.    Nutrition:     Eat at least 5 servings of fruits and vegetables each day.    Eat whole-grain bread, whole-wheat pasta and brown rice instead of white grains and rice.    Get adequate Calcium and Vitamin D.     Lifestyle    Exercise at least 150 minutes a week (30 minutes a day, 5 days a week). This will help you control your weight and prevent disease.    Limit alcohol to one drink per day.    No smoking.     Wear sunscreen to prevent skin cancer.     See your dentist twice a year for an exam and cleaning.    See your eye doctor every 1 to 2 years to screen for conditions such as glaucoma, macular degeneration and cataracts.    Personalized Prevention Plan  You are due for the preventive services outlined below.  Your care team is available to assist you in scheduling these services.  If you have already completed any of these items, please share that information with your care team to update in your medical record.  Health Maintenance   Topic Date Due     DEXA  12/12/1963     ZOSTER IMMUNIZATION (2 of 3) 02/26/2009     PHQ-2  01/01/2020     DTAP/TDAP/TD IMMUNIZATION (2 - Td) 03/02/2020     MAMMO SCREENING  08/22/2020     FALL RISK ASSESSMENT  10/08/2020     MEDICARE ANNUAL WELLNESS VISIT  12/15/2021     ADVANCE CARE PLANNING  09/28/2022     LIPID  10/08/2024     COLORECTAL CANCER SCREENING  01/07/2030     HEPATITIS C SCREENING  Completed     INFLUENZA VACCINE  Completed     Pneumococcal Vaccine: 65+ Years  Completed     Pneumococcal Vaccine: Pediatrics (0 to 5 Years) and At-Risk Patients (6 to 64 Years)  Aged Out     IPV IMMUNIZATION  Aged Out     MENINGITIS IMMUNIZATION  Aged Out     HEPATITIS B IMMUNIZATION  Aged Out

## 2020-12-15 ENCOUNTER — OFFICE VISIT (OUTPATIENT)
Dept: FAMILY MEDICINE | Facility: CLINIC | Age: 75
End: 2020-12-15
Payer: COMMERCIAL

## 2020-12-15 VITALS
HEIGHT: 65 IN | OXYGEN SATURATION: 100 % | HEART RATE: 59 BPM | WEIGHT: 124 LBS | SYSTOLIC BLOOD PRESSURE: 146 MMHG | BODY MASS INDEX: 20.66 KG/M2 | TEMPERATURE: 96.6 F | DIASTOLIC BLOOD PRESSURE: 91 MMHG | RESPIRATION RATE: 13 BRPM

## 2020-12-15 DIAGNOSIS — Z00.00 ENCOUNTER FOR MEDICARE ANNUAL WELLNESS EXAM: Primary | ICD-10-CM

## 2020-12-15 DIAGNOSIS — I10 ESSENTIAL HYPERTENSION WITH GOAL BLOOD PRESSURE LESS THAN 140/90: Chronic | ICD-10-CM

## 2020-12-15 DIAGNOSIS — E78.5 HYPERLIPIDEMIA LDL GOAL <100: ICD-10-CM

## 2020-12-15 DIAGNOSIS — Z12.31 ENCOUNTER FOR SCREENING MAMMOGRAM FOR BREAST CANCER: ICD-10-CM

## 2020-12-15 DIAGNOSIS — Z23 NEED FOR VACCINATION: ICD-10-CM

## 2020-12-15 LAB
% GRANULOCYTES: 55.2 %G (ref 40–75)
ALBUMIN SERPL-MCNC: 4 G/DL (ref 3.4–5)
ALP SERPL-CCNC: 89 U/L (ref 40–150)
ALT SERPL W P-5'-P-CCNC: 22 U/L (ref 0–50)
ANION GAP SERPL CALCULATED.3IONS-SCNC: 4 MMOL/L (ref 3–14)
AST SERPL W P-5'-P-CCNC: 20 U/L (ref 0–45)
BILIRUB SERPL-MCNC: 0.5 MG/DL (ref 0.2–1.3)
BUN SERPL-MCNC: 20 MG/DL (ref 7–30)
CALCIUM SERPL-MCNC: 9.6 MG/DL (ref 8.5–10.1)
CHLORIDE SERPL-SCNC: 107 MMOL/L (ref 94–109)
CHOLEST SERPL-MCNC: 221 MG/DL (ref 0–200)
CHOLEST/HDLC SERPL: 2.9 {RATIO} (ref 0–5)
CO2 SERPL-SCNC: 27 MMOL/L (ref 20–32)
CREAT SERPL-MCNC: 0.88 MG/DL (ref 0.52–1.04)
ERYTHROCYTE [DISTWIDTH] IN BLOOD BY AUTOMATED COUNT: 11.9 %
FASTING SPECIMEN: YES
GFR SERPL CREATININE-BSD FRML MDRD: 64 ML/MIN/{1.73_M2}
GLUCOSE SERPL-MCNC: 100 MG/DL (ref 70–99)
GRANULOCYTES #: 4.2 K/UL (ref 1.6–8.3)
HCT VFR BLD AUTO: 40.1 % (ref 35–47)
HDLC SERPL-MCNC: 77 MG/DL
HEMOGLOBIN: 13.1 G/DL (ref 11.7–15.7)
LDLC SERPL CALC-MCNC: 122 MG/DL (ref 0–129)
LYMPHOCYTES # BLD AUTO: 2.6 K/UL (ref 0.8–5.3)
LYMPHOCYTES NFR BLD AUTO: 34.6 %L (ref 20–48)
MCH RBC QN AUTO: 29.6 PG (ref 26.5–35)
MCHC RBC AUTO-ENTMCNC: 32.7 G/DL (ref 32–36)
MCV RBC AUTO: 90.5 FL (ref 78–100)
MID #: 0.8 K/UL (ref 0–2.2)
MID %: 10.2 %M (ref 0–20)
PLATELET # BLD AUTO: 273 K/UL (ref 150–450)
POTASSIUM SERPL-SCNC: 4.4 MMOL/L (ref 3.4–5.3)
PROT SERPL-MCNC: 7.8 G/DL (ref 6.8–8.8)
RBC # BLD AUTO: 4.43 M/UL (ref 3.8–5.2)
SODIUM SERPL-SCNC: 138 MMOL/L (ref 133–144)
TRIGL SERPL-MCNC: 114 MG/DL (ref 0–150)
VLDL-CHOLESTEROL: 23 (ref 7–32)
WBC # BLD AUTO: 7.6 K/UL (ref 4–11)

## 2020-12-15 RX ORDER — ATENOLOL 100 MG/1
100 TABLET ORAL DAILY
Qty: 90 TABLET | Refills: 3 | Status: SHIPPED | OUTPATIENT
Start: 2020-12-15 | End: 2021-12-07

## 2020-12-15 RX ORDER — PRAVASTATIN SODIUM 10 MG
10 TABLET ORAL DAILY
Qty: 90 TABLET | Refills: 3 | Status: SHIPPED | OUTPATIENT
Start: 2020-12-15 | End: 2021-12-07

## 2020-12-15 RX ORDER — LISINOPRIL 30 MG/1
30 TABLET ORAL DAILY
Qty: 90 TABLET | Refills: 3 | Status: SHIPPED | OUTPATIENT
Start: 2020-12-15 | End: 2021-12-07

## 2020-12-15 SDOH — SOCIAL STABILITY: SOCIAL NETWORK: HOW OFTEN DO YOU ATTENT MEETINGS OF THE CLUB OR ORGANIZATION YOU BELONG TO?: NOT ASKED

## 2020-12-15 SDOH — ECONOMIC STABILITY: INCOME INSECURITY: HOW HARD IS IT FOR YOU TO PAY FOR THE VERY BASICS LIKE FOOD, HOUSING, MEDICAL CARE, AND HEATING?: NOT HARD AT ALL

## 2020-12-15 SDOH — ECONOMIC STABILITY: FOOD INSECURITY: WITHIN THE PAST 12 MONTHS, YOU WORRIED THAT YOUR FOOD WOULD RUN OUT BEFORE YOU GOT MONEY TO BUY MORE.: NEVER TRUE

## 2020-12-15 SDOH — ECONOMIC STABILITY: FOOD INSECURITY: WITHIN THE PAST 12 MONTHS, THE FOOD YOU BOUGHT JUST DIDN'T LAST AND YOU DIDN'T HAVE MONEY TO GET MORE.: NEVER TRUE

## 2020-12-15 SDOH — SOCIAL STABILITY: SOCIAL INSECURITY: WITHIN THE LAST YEAR, HAVE YOU BEEN HUMILIATED OR EMOTIONALLY ABUSED IN OTHER WAYS BY YOUR PARTNER OR EX-PARTNER?: NO

## 2020-12-15 SDOH — SOCIAL STABILITY: SOCIAL NETWORK: HOW OFTEN DO YOU GET TOGETHER WITH FRIENDS OR RELATIVES?: NOT ASKED

## 2020-12-15 SDOH — SOCIAL STABILITY: SOCIAL NETWORK: HOW OFTEN DO YOU ATTEND CHURCH OR RELIGIOUS SERVICES?: NOT ASKED

## 2020-12-15 SDOH — SOCIAL STABILITY: SOCIAL NETWORK: IN A TYPICAL WEEK, HOW MANY TIMES DO YOU TALK ON THE PHONE WITH FAMILY, FRIENDS, OR NEIGHBORS?: NOT ASKED

## 2020-12-15 SDOH — SOCIAL STABILITY: SOCIAL NETWORK
DO YOU BELONG TO ANY CLUBS OR ORGANIZATIONS SUCH AS CHURCH GROUPS UNIONS, FRATERNAL OR ATHLETIC GROUPS, OR SCHOOL GROUPS?: NOT ASKED

## 2020-12-15 SDOH — SOCIAL STABILITY: SOCIAL INSECURITY
WITHIN THE LAST YEAR, HAVE YOU BEEN KICKED, HIT, SLAPPED, OR OTHERWISE PHYSICALLY HURT BY YOUR PARTNER OR EX-PARTNER?: NO

## 2020-12-15 SDOH — SOCIAL STABILITY: SOCIAL INSECURITY
WITHIN THE LAST YEAR, HAVE TO BEEN RAPED OR FORCED TO HAVE ANY KIND OF SEXUAL ACTIVITY BY YOUR PARTNER OR EX-PARTNER?: NO

## 2020-12-15 SDOH — HEALTH STABILITY: PHYSICAL HEALTH: ON AVERAGE, HOW MANY DAYS PER WEEK DO YOU ENGAGE IN MODERATE TO STRENUOUS EXERCISE (LIKE A BRISK WALK)?: 6 DAYS

## 2020-12-15 SDOH — ECONOMIC STABILITY: TRANSPORTATION INSECURITY
IN THE PAST 12 MONTHS, HAS THE LACK OF TRANSPORTATION KEPT YOU FROM MEDICAL APPOINTMENTS OR FROM GETTING MEDICATIONS?: NO

## 2020-12-15 SDOH — SOCIAL STABILITY: SOCIAL NETWORK: ARE YOU MARRIED, WIDOWED, DIVORCED, SEPARATED, NEVER MARRIED, OR LIVING WITH A PARTNER?: NOT ASKED

## 2020-12-15 SDOH — SOCIAL STABILITY: SOCIAL INSECURITY: WITHIN THE LAST YEAR, HAVE YOU BEEN AFRAID OF YOUR PARTNER OR EX-PARTNER?: NO

## 2020-12-15 SDOH — ECONOMIC STABILITY: TRANSPORTATION INSECURITY
IN THE PAST 12 MONTHS, HAS LACK OF TRANSPORTATION KEPT YOU FROM MEETINGS, WORK, OR FROM GETTING THINGS NEEDED FOR DAILY LIVING?: NO

## 2020-12-15 ASSESSMENT — MIFFLIN-ST. JEOR: SCORE: 1061.46

## 2020-12-15 NOTE — NURSING NOTE
"75 year old  Chief Complaint   Patient presents with     Physical       Blood pressure (!) 164/105, pulse 59, temperature 96.6  F (35.9  C), resp. rate 13, height 1.648 m (5' 4.88\"), weight 56.2 kg (124 lb), SpO2 100 %. Body mass index is 20.71 kg/m .  Patient Active Problem List   Diagnosis     Osteoarthritis of knee     Seasonal allergies     Atrophic vaginitis     Essential hypertension with goal blood pressure less than 140/90     Basal cell adenoma     Hyperlipidemia LDL goal <100       Wt Readings from Last 2 Encounters:   12/15/20 56.2 kg (124 lb)   10/08/19 56.4 kg (124 lb 4 oz)     BP Readings from Last 3 Encounters:   12/15/20 (!) 164/105   10/08/19 (!) 143/83   10/02/18 93/73         Current Outpatient Medications   Medication     atenolol (TENORMIN) 100 MG tablet     cetirizine (ZYRTEC) 10 MG tablet     lisinopril (ZESTRIL) 20 MG tablet     pravastatin (PRAVACHOL) 10 MG tablet     cholecalciferol (VITAMIN  -D) 1000 UNITS capsule     COMPOUNDED NON-CONTROLLED SUBSTANCE (CMPD RX) - PHARMACY TO MIX COMPOUNDED MEDICATION     No current facility-administered medications for this visit.        Social History     Tobacco Use     Smoking status: Never Smoker     Smokeless tobacco: Never Used   Substance Use Topics     Alcohol use: Yes     Alcohol/week: 7.0 standard drinks     Types: 7 Standard drinks or equivalent per week     Frequency: 4 or more times a week     Drinks per session: 1 or 2     Binge frequency: Never     Drug use: No       Health Maintenance Due   Topic Date Due     DEXA  12/12/1963     ZOSTER IMMUNIZATION (2 of 3) 02/26/2009     PHQ-2  01/01/2020     DTAP/TDAP/TD IMMUNIZATION (2 - Td) 03/02/2020     MAMMO SCREENING  08/22/2020     FALL RISK ASSESSMENT  10/08/2020       No results found for: PAP      December 15, 2020 9:22 AM  "

## 2021-01-15 ENCOUNTER — HEALTH MAINTENANCE LETTER (OUTPATIENT)
Age: 76
End: 2021-01-15

## 2021-08-26 ENCOUNTER — OFFICE VISIT (OUTPATIENT)
Dept: FAMILY MEDICINE | Facility: CLINIC | Age: 76
End: 2021-08-26
Payer: COMMERCIAL

## 2021-08-26 VITALS
WEIGHT: 123 LBS | RESPIRATION RATE: 13 BRPM | TEMPERATURE: 97.8 F | BODY MASS INDEX: 20.49 KG/M2 | HEIGHT: 65 IN | HEART RATE: 61 BPM | OXYGEN SATURATION: 99 % | DIASTOLIC BLOOD PRESSURE: 88 MMHG | SYSTOLIC BLOOD PRESSURE: 134 MMHG

## 2021-08-26 DIAGNOSIS — R30.0 DYSURIA: ICD-10-CM

## 2021-08-26 DIAGNOSIS — N32.9 BLADDER PROBLEM: ICD-10-CM

## 2021-08-26 DIAGNOSIS — N30.01 ACUTE CYSTITIS WITH HEMATURIA: Primary | ICD-10-CM

## 2021-08-26 PROBLEM — G89.29 CHRONIC PAIN OF RIGHT KNEE: Status: ACTIVE | Noted: 2021-05-27

## 2021-08-26 PROBLEM — M25.561 CHRONIC PAIN OF RIGHT KNEE: Status: ACTIVE | Noted: 2021-05-27

## 2021-08-26 LAB
ALBUMIN UR-MCNC: NEGATIVE MG/DL
APPEARANCE UR: ABNORMAL
BILIRUB UR QL STRIP: NEGATIVE
COLOR UR AUTO: YELLOW
GLUCOSE UR STRIP-MCNC: NEGATIVE MG/DL
HGB UR QL STRIP: ABNORMAL
KETONES UR STRIP-MCNC: NEGATIVE MG/DL
LEUKOCYTE ESTERASE UR QL STRIP: ABNORMAL
NITRATE UR QL: NEGATIVE
PH UR STRIP: 6 [PH] (ref 5–7)
SP GR UR STRIP: 1.01 (ref 1–1.03)
UROBILINOGEN UR STRIP-ACNC: 0.2 E.U./DL

## 2021-08-26 PROCEDURE — 87086 URINE CULTURE/COLONY COUNT: CPT | Performed by: PHYSICIAN ASSISTANT

## 2021-08-26 RX ORDER — NITROFURANTOIN 25; 75 MG/1; MG/1
100 CAPSULE ORAL 2 TIMES DAILY
Qty: 14 CAPSULE | Refills: 0 | Status: SHIPPED | OUTPATIENT
Start: 2021-08-26 | End: 2021-09-01

## 2021-08-26 ASSESSMENT — MIFFLIN-ST. JEOR: SCORE: 1056.92

## 2021-08-26 NOTE — PROGRESS NOTES
ASSESSMENT:   (N30.01) Acute cystitis with hematuria  (primary encounter diagnosis)  Plan: nitroFURantoin macrocrystal-monohydrate         (MACROBID) 100 MG capsule        Drink plenty of water and Follow up if you have any worsening or persistent problems or concerns.    (N32.9) Bladder problem  Plan: Urinalysis Macroscopic            (R30.0) Dysuria  Plan: Urine Culture Aerobic Bacterial           PLAN:  Drink plenty of fluids.  Prevention and treatment of UTI's discussed.Signs and symptoms of pyelonephritis mentioned.  Follow up with primary care provider if not improving.                   Return if symptoms worsen or fail to improve.    Giulia Hairston PA-C  [facility]    Subjective   Shana is a 75 year old who presents for the following health issues           SUBJECTIVE:  Shana Us is a 75 year old female who  presents today for a possible UTI. Symptoms of dysuria, urgency and frequency have been going on for 1day(s).  Hematuria yes this morning.  sudden onset and mild.  There is no history of fever, chills, nausea or vomiting.  No history of vaginal or penile discharge. This patient does not have a history of urinary tract infections and kidney stones.     Known history of atrophic vaginitis.  Has tried estrogen cream and it made things worse.    Past Medical History:   Diagnosis Date     Atrophic vaginitis      Basal cell adenoma      Hyperlipidemia      Hypertension      Osteoarthritis      Seasonal allergies      Current Outpatient Medications   Medication Sig Dispense Refill     atenolol (TENORMIN) 100 MG tablet Take 1 tablet (100 mg) by mouth daily 90 tablet 3     cetirizine (ZYRTEC) 10 MG tablet Take 1 tablet (10 mg) by mouth daily 30 tablet      cholecalciferol (VITAMIN  -D) 1000 UNITS capsule Take 1 capsule by mouth daily        lisinopril (ZESTRIL) 30 MG tablet Take 1 tablet (30 mg) by mouth daily 90 tablet 3     nitroFURantoin macrocrystal-monohydrate (MACROBID) 100 MG capsule Take  "1 capsule (100 mg) by mouth 2 times daily for 7 days 14 capsule 0     pravastatin (PRAVACHOL) 10 MG tablet Take 1 tablet (10 mg) by mouth daily 90 tablet 3     COMPOUNDED NON-CONTROLLED SUBSTANCE (CMPD RX) - PHARMACY TO MIX COMPOUNDED MEDICATION Estradiol 0.02% in HRT cream Apply 2 grams,  intravaginally and small amount externally daily for one week then twice weekly for maintenance. (Patient not taking: Reported on 12/15/2020) 30 g 3     Social History     Tobacco Use     Smoking status: Never Smoker     Smokeless tobacco: Never Used   Substance Use Topics     Alcohol use: Yes     Alcohol/week: 7.0 standard drinks     Types: 7 Standard drinks or equivalent per week       ROS:   CONSTITUTIONAL:NEGATIVE for fever, chills, change in weight  INTEGUMENTARY/SKIN: NEGATIVE for worrisome rashes, moles or lesions  GI: NEGATIVE for nausea, abdominal pain, heartburn, or change in bowel habits  : as above    OBJECTIVE:  /88   Pulse 61   Temp 97.8  F (36.6  C)   Resp 13   Ht 1.648 m (5' 4.88\")   Wt 55.8 kg (123 lb)   SpO2 99%   BMI 20.54 kg/m    GENERAL APPEARANCE: healthy, alert and no distress  RESP: lungs clear to auscultation - no rales, rhonchi or wheezes  CV: regular rates and rhythm, normal S1 S2, no murmur noted  ABDOMEN:  soft, nontender, no HSM or masses and bowel sounds normal  BACK: No CVA tenderness  SKIN: no suspicious lesions or rashes    Recent Results (from the past 24 hour(s))   Urinalysis Macroscopic    Collection Time: 08/26/21 10:55 AM   Result Value Ref Range    Color Urine Yellow Colorless, Straw, Light Yellow, Yellow    Appearance Urine Cloudy (A) Clear    Glucose Urine Negative Negative mg/dL    Bilirubin Urine Negative Negative    Ketones Urine Negative Negative mg/dL    Specific Gravity Urine 1.010 1.003 - 1.035    Blood Urine Moderate (A) Negative    pH Urine 6.0 5.0 - 7.0    Protein Albumin Urine Negative Negative mg/dL    Urobilinogen Urine 0.2 0.2, 1.0 E.U./dL    Nitrite Urine " Negative Negative    Leukocyte Esterase Urine Small (A) Negative

## 2021-08-26 NOTE — NURSING NOTE
"75 year old  Chief Complaint   Patient presents with     Urinary Problem     uncomfortableness when using the bathrrom and blood in urine. Started yesterday.       Blood pressure (!) 153/91, pulse 61, temperature 97.8  F (36.6  C), resp. rate 13, height 1.648 m (5' 4.88\"), weight 55.8 kg (123 lb), SpO2 99 %. Body mass index is 20.54 kg/m .  Patient Active Problem List   Diagnosis     Osteoarthritis of knee     Seasonal allergies     Atrophic vaginitis     Essential hypertension with goal blood pressure less than 140/90     Basal cell adenoma     Hyperlipidemia LDL goal <100       Wt Readings from Last 2 Encounters:   08/26/21 55.8 kg (123 lb)   12/15/20 56.2 kg (124 lb)     BP Readings from Last 3 Encounters:   08/26/21 (!) 153/91   12/15/20 (!) 146/91   10/08/19 (!) 143/83         Current Outpatient Medications   Medication     atenolol (TENORMIN) 100 MG tablet     cetirizine (ZYRTEC) 10 MG tablet     cholecalciferol (VITAMIN  -D) 1000 UNITS capsule     lisinopril (ZESTRIL) 30 MG tablet     pravastatin (PRAVACHOL) 10 MG tablet     COMPOUNDED NON-CONTROLLED SUBSTANCE (CMPD RX) - PHARMACY TO MIX COMPOUNDED MEDICATION     No current facility-administered medications for this visit.       Social History     Tobacco Use     Smoking status: Never Smoker     Smokeless tobacco: Never Used   Substance Use Topics     Alcohol use: Yes     Alcohol/week: 7.0 standard drinks     Types: 7 Standard drinks or equivalent per week     Drug use: No       Health Maintenance Due   Topic Date Due     COVID-19 Vaccine (1) Never done     DEXA  Never done     ZOSTER IMMUNIZATION (2 of 3) 02/26/2009     MAMMO SCREENING  08/22/2020     PHQ-2  01/01/2021     INFLUENZA VACCINE (1) 09/01/2021       No results found for: PAP      August 26, 2021 10:52 AM  "

## 2021-08-27 LAB — BACTERIA UR CULT: NO GROWTH

## 2021-09-01 ENCOUNTER — OFFICE VISIT (OUTPATIENT)
Dept: FAMILY MEDICINE | Facility: CLINIC | Age: 76
End: 2021-09-01
Payer: COMMERCIAL

## 2021-09-01 VITALS
HEIGHT: 65 IN | BODY MASS INDEX: 20.18 KG/M2 | RESPIRATION RATE: 15 BRPM | HEART RATE: 58 BPM | OXYGEN SATURATION: 99 % | DIASTOLIC BLOOD PRESSURE: 79 MMHG | WEIGHT: 121.12 LBS | SYSTOLIC BLOOD PRESSURE: 119 MMHG | TEMPERATURE: 97.9 F

## 2021-09-01 DIAGNOSIS — M17.11 PRIMARY OSTEOARTHRITIS OF RIGHT KNEE: ICD-10-CM

## 2021-09-01 DIAGNOSIS — Z01.818 PREOP GENERAL PHYSICAL EXAM: Primary | ICD-10-CM

## 2021-09-01 DIAGNOSIS — I10 ESSENTIAL HYPERTENSION WITH GOAL BLOOD PRESSURE LESS THAN 140/90: ICD-10-CM

## 2021-09-01 DIAGNOSIS — N30.01 ACUTE CYSTITIS WITH HEMATURIA: ICD-10-CM

## 2021-09-01 PROBLEM — C44.320 SCC (SQUAMOUS CELL CARCINOMA), FACE: Status: ACTIVE | Noted: 2021-09-01

## 2021-09-01 LAB
ANION GAP SERPL CALCULATED.3IONS-SCNC: 6 MMOL/L (ref 3–14)
BUN SERPL-MCNC: 19 MG/DL (ref 7–30)
CALCIUM SERPL-MCNC: 9.7 MG/DL (ref 8.5–10.1)
CHLORIDE BLD-SCNC: 106 MMOL/L (ref 94–109)
CO2 SERPL-SCNC: 26 MMOL/L (ref 20–32)
CREAT SERPL-MCNC: 1.13 MG/DL (ref 0.52–1.04)
ERYTHROCYTE [DISTWIDTH] IN BLOOD BY AUTOMATED COUNT: 12.3 % (ref 10–15)
GFR SERPL CREATININE-BSD FRML MDRD: 48 ML/MIN/1.73M2
GLUCOSE BLD-MCNC: 95 MG/DL (ref 70–99)
HCT VFR BLD AUTO: 39.9 % (ref 35–47)
HGB BLD-MCNC: 13.3 G/DL (ref 11.7–15.7)
HOLD SPECIMEN: NORMAL
MCH RBC QN AUTO: 30 PG (ref 26.5–33)
MCHC RBC AUTO-ENTMCNC: 33.3 G/DL (ref 31.5–36.5)
MCV RBC AUTO: 90 FL (ref 78–100)
PLATELET # BLD AUTO: 295 10E3/UL (ref 150–450)
POTASSIUM BLD-SCNC: 4.5 MMOL/L (ref 3.4–5.3)
RBC # BLD AUTO: 4.44 10E6/UL (ref 3.8–5.2)
SODIUM SERPL-SCNC: 138 MMOL/L (ref 133–144)
WBC # BLD AUTO: 9.3 10E3/UL (ref 4–11)

## 2021-09-01 PROCEDURE — 80048 BASIC METABOLIC PNL TOTAL CA: CPT | Performed by: PHYSICIAN ASSISTANT

## 2021-09-01 PROCEDURE — 87086 URINE CULTURE/COLONY COUNT: CPT | Performed by: PHYSICIAN ASSISTANT

## 2021-09-01 ASSESSMENT — MIFFLIN-ST. JEOR: SCORE: 1048.4

## 2021-09-01 NOTE — NURSING NOTE
"75 year old  Chief Complaint   Patient presents with     Pre-Op Exam       Blood pressure 119/79, pulse 58, temperature 97.9  F (36.6  C), resp. rate 15, height 1.648 m (5' 4.88\"), weight 54.9 kg (121 lb 1.9 oz), SpO2 99 %. Body mass index is 20.23 kg/m .  Patient Active Problem List   Diagnosis     Osteoarthritis of knee     Seasonal allergies     Atrophic vaginitis     Essential hypertension with goal blood pressure less than 140/90     Basal cell adenoma     Hyperlipidemia LDL goal <100     Chronic pain of right knee       Wt Readings from Last 2 Encounters:   09/01/21 54.9 kg (121 lb 1.9 oz)   08/26/21 55.8 kg (123 lb)     BP Readings from Last 3 Encounters:   09/01/21 119/79   08/26/21 134/88   12/15/20 (!) 146/91         Current Outpatient Medications   Medication     atenolol (TENORMIN) 100 MG tablet     cholecalciferol (VITAMIN  -D) 1000 UNITS capsule     COMPOUNDED NON-CONTROLLED SUBSTANCE (CMPD RX) - PHARMACY TO MIX COMPOUNDED MEDICATION     lisinopril (ZESTRIL) 30 MG tablet     pravastatin (PRAVACHOL) 10 MG tablet     cetirizine (ZYRTEC) 10 MG tablet     nitroFURantoin macrocrystal-monohydrate (MACROBID) 100 MG capsule     No current facility-administered medications for this visit.       Social History     Tobacco Use     Smoking status: Never Smoker     Smokeless tobacco: Never Used   Substance Use Topics     Alcohol use: Yes     Alcohol/week: 7.0 standard drinks     Types: 7 Standard drinks or equivalent per week     Drug use: No       Health Maintenance Due   Topic Date Due     COVID-19 Vaccine (1) Never done     DEXA  Never done     ZOSTER IMMUNIZATION (2 of 3) 02/26/2009     MAMMO SCREENING  08/22/2020     INFLUENZA VACCINE (1) 09/01/2021       No results found for: PAP      September 1, 2021 2:17 PM  "

## 2021-09-01 NOTE — PROGRESS NOTES
[address], [address]  [facility] [address]  Phone: [phone]  Fax: [phone]  Primary Provider: Giulia Elizabeth  Pre-op Performing Provider: GIULIA ELIZABETH      PREOPERATIVE EVALUATION:  Today's date: 9/1/2021    Shana Us is a 75 year old female who presents for a preoperative evaluation.    Surgical Information:  Surgery/Procedure: Total Knee Replacement  Surgery Location: [facility] orthopedic [facility]  Surgeon:Pilo Moore MD  Surgery Date: 09/08/2021  Time of Surgery: TBD  Where patient plans to recover: At home with family  Fax number for surgical facility: [phone]    Type of Anesthesia Anticipated: General    Assessment & Plan     The proposed surgical procedure is considered INTERMEDIATE risk.    (Z01.818) Preop general physical exam  (primary encounter diagnosis)  Plan: Basic Metabolic Panel ([place]), CBC with         Platelets and Reflex to Iron Studies    (M17.11) Primary osteoarthritis of right knee    (I10) Essential hypertension with goal blood pressure less than 140/90  Plan: Basic Metabolic Panel ([place]), CBC with         Platelets and Reflex to Iron Studies            (N30.01) Acute cystitis with hematuria  Comment: Recent infection treated with antibiotic.  Urine culture to verify clearance  Plan: Urine Culture Aerobic Bacterial                Risks and Recommendations:  The patient has the following additional risks and recommendations for perioperative complications:   - No identified additional risk factors other than previously addressed    Medication Instructions:  Patient is to take all scheduled medications on the day of surgery   All supplements stopped 9/1/2021    RECOMMENDATION:  APPROVAL GIVEN to proceed with proposed procedure, without further diagnostic evaluation.      35 minutes spent on the date of the encounter doing chart review, history and exam, documentation and further activities per  the note        Subjective     HPI related to upcoming procedure: Progresive right knee pain, worse with walking for extended period of time..     Preop Questions 9/1/2021   1. Have you ever had a heart attack or stroke? No   2. Have you ever had surgery on your heart or blood vessels, such as a stent placement, a coronary artery bypass, or surgery on an artery in your head, neck, heart, or legs? No   3. Do you have chest pain with activity? No   4. Do you have a history of  heart failure? No   5. Do you currently have a cold, bronchitis or symptoms of other infection? No   6. Do you have a cough, shortness of breath, or wheezing? No   7. Do you or anyone in your family have previous history of blood clots? No   8. Do you or does anyone in your family have a serious bleeding problem such as prolonged bleeding following surgeries or cuts? No   9. Have you ever had problems with anemia or been told to take iron pills? YES - Related to pregnancy   10. Have you had any abnormal blood loss such as black, tarry or bloody stools, or abnormal vaginal bleeding? No   11. Have you ever had a blood transfusion? No   12. Are you willing to have a blood transfusion if it is medically needed before, during, or after your surgery? Yes   13. Have you or any of your relatives ever had problems with anesthesia? YES - Last surgery she had severe uncontrolled vomitting and nausea 1-2 days after left knee replacement.   14. Do you have sleep apnea, excessive snoring or daytime drowsiness? No   15. Do you have any artifical heart valves or other implanted medical devices like a pacemaker, defibrillator, or continuous glucose monitor? No   16. Do you have artificial joints? YES - Left knee repleacement   17. Are you allergic to latex? No       Health Care Directive:  Patient does not have a Health Care Directive or Living Will: Discussed advance care planning with patient; information given to patient to review.    Preoperative Review of  :  PDMP Review       Value Time User    State PDMP site checked  Yes 9/1/2021  2:29 PM Giulia Hairston PA-C        No concerns identified   reviewed - no record of controlled substances prescribed.      Status of Chronic Conditions:  HYPERLIPIDEMIA - Patient has a long history of significant Hyperlipidemia requiring medication for treatment with recent good control. Patient reports no problems or side effects with the medication.     HYPERTENSION - Patient has longstanding history of HTN , currently denies any symptoms referable to elevated blood pressure. Specifically denies chest pain, palpitations, dyspnea, orthopnea, PND or peripheral edema. Blood pressure readings have been in normal range. Current medication regimen is as listed below. Patient denies any side effects of medication.       Review of Systems  CONSTITUTIONAL: NEGATIVE for fever, chills, change in weight  INTEGUMENTARY/SKIN: NEGATIVE for worrisome rashes, moles or lesions  EYES: NEGATIVE for vision changes or irritation  ENT/MOUTH: NEGATIVE for ear, mouth and throat problems  RESP: NEGATIVE for significant cough or SOB  CV: NEGATIVE for chest pain, palpitations or peripheral edema  GI: NEGATIVE for nausea, abdominal pain, heartburn, or change in bowel habits  : NEGATIVE for frequency, dysuria, or hematuria  MUSCULOSKELETAL:right knee pain swelling and crepitance  NEURO: NEGATIVE for weakness, dizziness or paresthesias  ENDOCRINE: NEGATIVE for temperature intolerance, skin/hair changes  HEME: NEGATIVE for bleeding problems  PSYCHIATRIC: NEGATIVE for changes in mood or affect    Patient Active Problem List    Diagnosis Date Noted     SCC (squamous cell carcinoma), face 09/01/2021     Priority: Medium     Followed by Dr. Reyes       Chronic pain of right knee 05/27/2021     Priority: Medium     Hyperlipidemia LDL goal <100 10/10/2017     Priority: Medium     Basal cell adenoma 09/28/2017     Priority: Medium     Three occurrences.  Under  surveillance with dermatology Q6 months in [place].       Essential hypertension with goal blood pressure less than 140/90 09/09/2016     Priority: Medium     Osteoarthritis of knee 10/01/2013     Priority: Medium     R>L       Seasonal allergies 10/01/2013     Priority: Medium     Nonallergic rhinitis, uses saline wash       Atrophic vaginitis 10/01/2013     Priority: Medium      Past Medical History:   Diagnosis Date     Atrophic vaginitis      Basal cell adenoma      Hyperlipidemia      Hypertension      Osteoarthritis      Seasonal allergies      Past Surgical History:   Procedure Laterality Date     BREAST BIOPSY, RT/LT      right breast , benign     COLONOSCOPY  2006     FOOT SURGERY       KNEE SURGERY Right 1994     MOHS MICROGRAPHIC PROCEDURE       Current Outpatient Medications   Medication Sig Dispense Refill     atenolol (TENORMIN) 100 MG tablet Take 1 tablet (100 mg) by mouth daily 90 tablet 3     chlorhexidine (HIBICLENS) 4 % liquid Use once a day in the shower for five days on the neck down (avoid genital area, do not use on face, ears, or mouth)       cholecalciferol (VITAMIN  -D) 1000 UNITS capsule Take 1 capsule by mouth daily        COMPOUNDED NON-CONTROLLED SUBSTANCE (CMPD RX) - PHARMACY TO MIX COMPOUNDED MEDICATION Estradiol 0.02% in HRT cream Apply 2 grams,  intravaginally and small amount externally daily for one week then twice weekly for maintenance. 30 g 3     lisinopril (ZESTRIL) 30 MG tablet Take 1 tablet (30 mg) by mouth daily 90 tablet 3     mupirocin (BACTROBAN) 2 % external ointment Use twice a day for five days in each nares       pravastatin (PRAVACHOL) 10 MG tablet Take 1 tablet (10 mg) by mouth daily 90 tablet 3       Allergies   Allergen Reactions     Oxycodone Nausea and Vomiting     Tolerated dilaudid        Social History     Tobacco Use     Smoking status: Never Smoker     Smokeless tobacco: Never Used   Substance Use Topics     Alcohol use: Yes     Alcohol/week: 7.0 standard  "drinks     Types: 7 Standard drinks or equivalent per week     History   Drug Use No         Objective     /79   Pulse 58   Temp 97.9  F (36.6  C)   Resp 15   Ht 1.648 m (5' 4.88\")   Wt 54.9 kg (121 lb 1.9 oz)   SpO2 99%   BMI 20.23 kg/m      Physical Exam    GENERAL APPEARANCE: healthy, alert and no distress     EYES: EOMI, PERRL     HENT: ear canals and TM's normal and nose and mouth without ulcers or lesions     NECK: no adenopathy, no asymmetry, masses, or scars and thyroid normal to palpation. No bruits     RESP: lungs clear to auscultation - no rales, rhonchi or wheezes     CV: regular rates and rhythm, normal S1 S2, no S3 or S4 and no murmur, click or rub     ABDOMEN:  soft, nontender, no HSM or masses and bowel sounds normal     MS: extremities normal- no gross deformities noted, no evidence of inflammation in joints, FROM in all extremities.  no clubbing, edema or cyanosis of extremities.  Pulses = and appropriate bilaterally to DP and PT     SKIN: no suspicious lesions or rashes     NEURO: Normal strength and tone, sensory exam grossly normal, mentation intact and speech normal     PSYCH: mentation appears normal. and affect normal/bright     LYMPHATICS: No cervical adenopathy        Diagnostics:  Recent Results (from the past 168 hour(s))   Basic Metabolic Panel ([place])    Collection Time: 09/01/21  2:46 PM   Result Value Ref Range    Sodium 138 133 - 144 mmol/L    Potassium 4.5 3.4 - 5.3 mmol/L    Chloride 106 94 - 109 mmol/L    Carbon Dioxide (CO2) 26 20 - 32 mmol/L    Anion Gap 6 3 - 14 mmol/L    Urea Nitrogen 19 7 - 30 mg/dL    Creatinine 1.13 (H) 0.52 - 1.04 mg/dL    Calcium 9.7 8.5 - 10.1 mg/dL    Glucose 95 70 - 99 mg/dL    GFR Estimate 48 (L) >60 mL/min/1.73m2   Extra Green Top (Lithium Heparin) Tube    Collection Time: 09/01/21  2:46 PM   Result Value Ref Range    Hold Specimen [facility]    Urine Culture Aerobic Bacterial    Collection Time: 09/01/21  2:47 PM    Specimen: Urine, Clean " Catch   Result Value Ref Range    Culture No Growth    CBC with Platelets and Reflex to Iron Studies    Collection Time: 09/01/21  2:47 PM   Result Value Ref Range    WBC Count 9.3 4.0 - 11.0 10e3/uL    RBC Count 4.44 3.80 - 5.20 10e6/uL    Hemoglobin 13.3 11.7 - 15.7 g/dL    Hematocrit 39.9 35.0 - 47.0 %    MCV 90 78 - 100 fL    MCH 30.0 26.5 - 33.0 pg    MCHC 33.3 31.5 - 36.5 g/dL    RDW 12.3 10.0 - 15.0 %    Platelet Count 295 150 - 450 10e3/uL   Basic metabolic panel    Collection Time: 09/03/21  9:07 AM   Result Value Ref Range    Sodium 134 133 - 144 mmol/L    Potassium 5.2 3.4 - 5.3 mmol/L    Chloride 104 94 - 109 mmol/L    Carbon Dioxide (CO2) 28 20 - 32 mmol/L    Anion Gap 2 (L) 3 - 14 mmol/L    Urea Nitrogen 17 7 - 30 mg/dL    Creatinine 0.87 0.52 - 1.04 mg/dL    Calcium 10.1 8.5 - 10.1 mg/dL    Glucose 99 70 - 99 mg/dL    GFR Estimate 66 >60 mL/min/1.73m2      No EKG required, no history of coronary heart disease, significant arrhythmia, peripheral arterial disease or other structural heart disease.    Revised Cardiac Risk Index (RCRI):  The patient has the following serious cardiovascular risks for perioperative complications:   - No serious cardiac risks = 0 points     RCRI Interpretation: 0 points: Class I (very low risk - 0.4% complication rate)           Signed Electronically by: Giulia Hairston PA-C  Copy of this evaluation report is provided to requesting physician.

## 2021-09-01 NOTE — PATIENT INSTRUCTIONS
How to Take Your Medication Before Surgery  - Take all of your medications before surgery as usual   -Pravastatin and atenolol the night before and   -Lisinopril in the AM    Take no aspirin, ibuprofen naprosyn or supplement for at least one week prior to surgery.  Preparing for Your Surgery  Getting started  A nurse will call you to review your health history and instructions. They will give you an arrival time based on your scheduled surgery time.  Please be ready to share the following:    Your doctor's clinic name and phone number    Your medical, surgical and anesthesia history    A list of allergies and sensitivities    A list of medicines, including herbal treatments and over-the-counter drugs    Whether the patient has a legal guardian (ask how to send us the papers in advance)  If you have a child who's having surgery, please ask for a copy of Preparing for Your Child's Surgery.    Preparing for surgery    Within 30 days of surgery: Have a pre-op exam (sometimes called an H&P, or History and Physical). This can be done at a clinic or pre-operative center.  ? If you're having a , you may not need this exam. Talk to your care team    At your pre-op exam, talk to your care team about all medicines you take. If you need to stop any medicines before surgery, ask when to start taking them again.  ? We do this for your safety. Many medicines can make you bleed too much during surgery. Some change how well surgery (anesthesia) drugs work.    Call your insurance company to let them know you're having surgery. (If you don't have insurance, call [phone].)    Call your clinic if there's any change in your health. This includes signs of a cold or flu (sore throat, runny nose, cough, rash, fever). It also includes a scrape or scratch near the surgery site.    If you have questions on the day of surgery, call your hospital or surgery center.  Eating and drinking guidelines  For your safety: Unless your  surgeon tells you otherwise, follow the guidelines below.    Eat and drink as usual until 8 hours before surgery. After that, no food or milk.    Drink clear liquids until 2 hours before surgery. These are liquids you can see through, like water, Gatorade and Propel Water. You may also have black coffee and tea (no cream or milk).    Nothing by mouth within 2 hours of surgery. This includes gum, candy and breath mints.    If you drink, stop drinking alcohol the night before surgery.    If your care team tells you to take medicine on the morning of surgery, it's okay to take it with a sip of water.  Preventing infection    Shower or bathe the night before and morning of your surgery. Follow the instructions your clinic gave you. (If no instructions, use regular soap.)    Don't shave or clip hair near your surgery site. We'll remove the hair if needed.    Don't smoke or vape the morning of surgery. You may chew nicotine gum up to 2 hours before surgery. A nicotine patch is okay.  ? Note: Some surgeries require you to completely quit smoking and nicotine. Check with your surgeon.    Your care team will make every effort to keep you safe from infection. We will:  ? Clean our hands often with soap and water (or an alcohol-based hand rub).  ? Clean the skin at your surgery site with a special soap that kills germs.  ? Give you a special gown to keep you warm. (Cold raises the risk of infection.)  ? Wear special hair covers, masks, gowns and gloves during surgery.  ? Give antibiotic medicine, if prescribed. Not all surgeries need antibiotics.  What to bring on the day of surgery    Photo ID and insurance card    Copy of your health care directive, if you have one    Glasses and hearing aides (bring cases)  ? You can't wear contacts during surgery    Inhaler and eye drops, if you use them (tell us about these when you arrive)    CPAP machine or breathing device, if you use them    A few personal items, if spending the  night    If you have . . .  ? A pacemaker or ICD (cardiac defibrillator): Bring the ID card.  ? An implanted stimulator: Bring the remote control.  ? A legal guardian: Bring a copy of the certified (court-stamped) guardianship papers.  Please remove any jewelry, including body piercings. Leave jewelry and other valuables at home.  If you're going home the day of surgery  Important: If you don't follow the rules below, we must cancel your surgery.     Arrange for someone to drive you home after surgery. You may not drive, take a taxi or take public transportation by yourself (unless you'll have local anesthesia only).    Arrange for a responsible adult to stay with you overnight. If you don't, we may keep you in the hospital overnight, and you may need to pay the costs yourself.  Questions?   If you have any questions for your care team, list them here: _________________________________________________________________________________________________________________________________________________________________________________________________________________________________________________________________________________________________________________________  For informational purposes only. Not to replace the advice of your health care provider. Copyright   2003, 2019 [facility]. All rights reserved. Clinically reviewed by Martha Nichole MD. [employer] [id number] - REV 4/20.

## 2021-09-02 DIAGNOSIS — R79.89 ELEVATED SERUM CREATININE: Primary | ICD-10-CM

## 2021-09-02 LAB — BACTERIA UR CULT: NO GROWTH

## 2021-09-03 ENCOUNTER — LAB (OUTPATIENT)
Dept: LAB | Facility: CLINIC | Age: 76
End: 2021-09-03
Payer: COMMERCIAL

## 2021-09-03 DIAGNOSIS — R79.89 ELEVATED SERUM CREATININE: ICD-10-CM

## 2021-09-03 LAB
ANION GAP SERPL CALCULATED.3IONS-SCNC: 2 MMOL/L (ref 3–14)
BUN SERPL-MCNC: 17 MG/DL (ref 7–30)
CALCIUM SERPL-MCNC: 10.1 MG/DL (ref 8.5–10.1)
CHLORIDE BLD-SCNC: 104 MMOL/L (ref 94–109)
CO2 SERPL-SCNC: 28 MMOL/L (ref 20–32)
CREAT SERPL-MCNC: 0.87 MG/DL (ref 0.52–1.04)
GFR SERPL CREATININE-BSD FRML MDRD: 66 ML/MIN/1.73M2
GLUCOSE BLD-MCNC: 99 MG/DL (ref 70–99)
POTASSIUM BLD-SCNC: 5.2 MMOL/L (ref 3.4–5.3)
SODIUM SERPL-SCNC: 134 MMOL/L (ref 133–144)

## 2021-09-03 PROCEDURE — 80048 BASIC METABOLIC PNL TOTAL CA: CPT | Performed by: PHYSICIAN ASSISTANT

## 2021-09-03 RX ORDER — MUPIROCIN 20 MG/G
OINTMENT TOPICAL
COMMUNITY
Start: 2021-09-02 | End: 2021-12-07

## 2021-09-04 ENCOUNTER — HEALTH MAINTENANCE LETTER (OUTPATIENT)
Age: 76
End: 2021-09-04

## 2021-10-24 ENCOUNTER — TRANSFERRED RECORDS (OUTPATIENT)
Dept: HEALTH INFORMATION MANAGEMENT | Facility: CLINIC | Age: 76
End: 2021-10-24
Payer: COMMERCIAL

## 2021-12-06 NOTE — PATIENT INSTRUCTIONS
Patient Education   Personalized Prevention Plan  You are due for the preventive services outlined below.  Your care team is available to assist you in scheduling these services.  If you have already completed any of these items, please share that information with your care team to update in your medical record.  Health Maintenance Due   Topic Date Due     Osteoporosis Screening  Never done     Zoster (Shingles) Vaccine (2 of 3) 02/26/2009     Mammogram  08/22/2020     COVID-19 Vaccine (2 - Pfizer 3-dose booster series) 11/23/2021     FALL RISK ASSESSMENT  12/15/2021     Preventive Health Recommendations    See your health care provider every year to    Review health changes.     Discuss preventive care.      Review your medicines if your doctor has prescribed any.    You no longer need a yearly Pap test unless you've had an abnormal Pap test in the past 10 years. If you have vaginal symptoms, such as bleeding or discharge, be sure to talk with your provider about a Pap test.    Every 1 to 2 years, have a mammogram.  If you are over 69, talk with your health care provider about whether or not you want to continue having screening mammograms.    Every 10 years, have a colonoscopy. Or, have a yearly FIT test (stool test). These exams will check for colon cancer.     Have a cholesterol test every 5 years, or more often if your doctor advises it.     Have a diabetes test (fasting glucose) every three years. If you are at risk for diabetes, you should have this test more often.     At age 65, have a bone density scan (DEXA) to check for osteoporosis (brittle bone disease).    Shots:    Get a flu shot each year.    Get a tetanus shot every 10 years.    Talk to your doctor about your pneumonia vaccines. There are now two you should receive - Pneumovax (PPSV 23) and Prevnar (PCV 13).    Talk to your pharmacist about the shingles vaccine.    Talk to your doctor about the hepatitis B vaccine.    Nutrition:     Eat at least  5 servings of fruits and vegetables each day.    Eat whole-grain bread, whole-wheat pasta and brown rice instead of white grains and rice.    Get adequate Calcium and Vitamin D.     Lifestyle    Exercise at least 150 minutes a week (30 minutes a day, 5 days a week). This will help you control your weight and prevent disease.    Limit alcohol to one drink per day.    No smoking.     Wear sunscreen to prevent skin cancer.     See your dentist twice a year for an exam and cleaning.    See your eye doctor every 1 to 2 years to screen for conditions such as glaucoma, macular degeneration and cataracts.    Personalized Prevention Plan  You are due for the preventive services outlined below.  Your care team is available to assist you in scheduling these services.  If you have already completed any of these items, please share that information with your care team to update in your medical record.  Health Maintenance   Topic Date Due     DANISHA  Never done     ZOSTER IMMUNIZATION (2 of 3) 02/26/2009     MAMMO SCREENING  08/22/2020     COVID-19 Vaccine (2 - Pfizer 3-dose booster series) 11/23/2021     FALL RISK ASSESSMENT  12/15/2021     MEDICARE ANNUAL WELLNESS VISIT  12/07/2022     COLORECTAL CANCER SCREENING  01/07/2023     LIPID  12/15/2025     ADVANCE CARE PLANNING  12/22/2025     DTAP/TDAP/TD IMMUNIZATION (3 - Td or Tdap) 04/02/2030     HEPATITIS C SCREENING  Completed     PHQ-2  Completed     INFLUENZA VACCINE  Completed     Pneumococcal Vaccine: 65+ Years  Completed     IPV IMMUNIZATION  Aged Out     MENINGITIS IMMUNIZATION  Aged Out     HEPATITIS B IMMUNIZATION  Aged Out

## 2021-12-06 NOTE — PROGRESS NOTES
"  SUBJECTIVE:   Shana Us is a 75 year old female who presents for Preventive Visit.    Are you in the first 12 months of your Medicare Part B coverage?  No     Physical Health:    In general, how would you rate your overall physical health? excellent    Outside of work, how many days during the week do you exercise? 4-5 days/week    Outside of work, approximately how many minutes a day do you exercise?45-60 minutes    If you drink alcohol do you typically have >3 drinks per day or >7 drinks per week? No    Do you usually eat at least 4 servings of fruit and vegetables a day, include whole grains & fiber and avoid regularly eating high fat or \"junk\" foods? Yes    Do you have any problems taking medications regularly?  No    Do you have any side effects from medications? none    Needs assistance for the following daily activities: no assistance needed    Which of the following safety concerns are present in your home?  none identified     Hearing impairment: No    In the past 6 months, have you been bothered by leaking of urine? no    Mental Health:    In general, how would you rate your overall mental or emotional health? excellent  PHQ-2 Score:     PHQ-2 ( 1999 Pfizer) 8/26/2021 12/15/2020   Q1: Little interest or pleasure in doing things 0 0   Q2: Feeling down, depressed or hopeless 0 0   PHQ-2 Score 0 0   PHQ-2 Total Score (12-17 Years)- Positive if 3 or more points; Administer PHQ-A if positive 0 0       Have you ever done Advance Care Planning? (For example, a Health Directive, POLST, or a discussion with a medical provider or your loved ones about your wishes): Yes, advance care planning is on file.    Additional concerns to address?  YES    Hyperlipidemia Follow-Up:  Taking pravastatin.  Doing well.  No concerns.  Due for labs today and needs refills.    Are you regularly taking any medication or supplement to lower your cholesterol?   Yes- pravastatin    Are you having muscle aches or other side effects " that you think could be caused by your cholesterol lowering medication?  No    Hypertension Follow-up: Patient denies symptoms associated with high blood pressure including blurred vision, headache, chest pain, heart palpitations, shortness of breath and pedal edema.     Do you check your blood pressure regularly outside of the clinic? Yes     Are you following a low salt diet? Yes    Are your blood pressures ever more than 140 on the top number (systolic) OR more   than 90 on the bottom number (diastolic), for example 140/90? No    Vaginal dryness:  Has struggled with this for years. Has tried estrogen cream and felt it made things worse. Currently using HCN cream OTC and vaseline.  Symptoms are better with this regimen.     Fall risk:  Fallen 2 or more times in the past year?: No  Any fall with injury in the past year?: No  click delete button to remove this line now    Cognitive Screenin) Repeat 3 items (Leader, Season, Table)    2) Clock draw: NORMAL  3) 3 item recall: Recalls 3 objects  Results: 3 items recalled: COGNITIVE IMPAIRMENT LESS LIKELY    Mini-CogTM Copyright VAISHALI Foss. Licensed by the author for use in [facility]; reprinted with permission (soob@[email or web address]). All rights reserved.      Do you have sleep apnea, excessive snoring or daytime drowsiness?: no        Reviewed and updated as needed this visit by clinical staff  Tobacco  Allergies  Meds  Problems  Med Hx  Surg Hx  Fam Hx  Soc Hx         Reviewed and updated as needed this visit by Provider  Tobacco  Allergies  Meds  Problems  Med Hx  Surg Hx  Fam Hx  Soc Hx        Patient Active Problem List    Diagnosis Date Noted     SCC (squamous cell carcinoma), face 2021     Priority: Medium     Followed by Dr. Reyes       Chronic pain of right knee 2021     Priority: Medium     Hyperlipidemia LDL goal <100 10/10/2017     Priority: Medium     Basal cell adenoma 2017     Priority: Medium     Three occurrences.   Under surveillance with dermatology Q6 months in [place].       Essential hypertension with goal blood pressure less than 140/90 09/09/2016     Priority: Medium     Osteoarthritis of knee 10/01/2013     Priority: Medium     R>L       Seasonal allergies 10/01/2013     Priority: Medium     Nonallergic rhinitis, uses saline wash       Atrophic vaginitis 10/01/2013     Priority: Medium       Past Medical History:   Diagnosis Date     Atrophic vaginitis      Basal cell adenoma      Hyperlipidemia      Hypertension      Osteoarthritis      Seasonal allergies        Past Surgical History:   Procedure Laterality Date     BREAST BIOPSY, RT/LT      right breast , benign     COLONOSCOPY  2006     FOOT SURGERY       KNEE SURGERY Right 1994     left total knee replacement Left      MOHS MICROGRAPHIC PROCEDURE       right total knee replac Right 09/08/2021       Family History   Problem Relation Age of Onset     Breast Cancer Mother 45     High cholesterol Mother      Prostate Cancer Father      High cholesterol Father      Hypertension Father      Hypertension Sister      Hyperlipidemia Sister        Social History     Tobacco Use     Smoking status: Never Smoker     Smokeless tobacco: Never Used   Substance Use Topics     Alcohol use: Yes     Alcohol/week: 7.0 standard drinks     Types: 7 Standard drinks or equivalent per week       Social History     Social History Narrative        Lives with . One dog.  Retired lives half the year in [place]. Three children and 5 grandchildren doing OK.    Has a good support system.    Feels safe in all environments.    Wears seatbelt 100% of the time    Wears helmet while biking.    Regular dental visits: yes    Do you have glasses or contacts: yes    Have you seen an eye MD in the past 2 years: yes    Do you snore: no.  Sleep apnea: no    Caffeine: Drinks per day: 1    Denies history of abuse, past or present, physical, sexual or emotional.    Nicole Hairston PA-C    12/07/21                             Current Outpatient Medications   Medication Sig Dispense Refill     atenolol (TENORMIN) 100 MG tablet Take 1 tablet (100 mg) by mouth daily 90 tablet 3     cholecalciferol (VITAMIN  -D) 1000 UNITS capsule Take 1 capsule by mouth daily        COMPOUNDED NON-CONTROLLED SUBSTANCE (CMPD RX) - PHARMACY TO MIX COMPOUNDED MEDICATION Estradiol 0.02% in HRT cream Apply 2 grams,  intravaginally and small amount externally daily for one week then twice weekly for maintenance. 30 g 3     lisinopril (ZESTRIL) 30 MG tablet Take 1 tablet (30 mg) by mouth daily 90 tablet 3     pravastatin (PRAVACHOL) 10 MG tablet Take 1 tablet (10 mg) by mouth daily 90 tablet 3                   Current providers sharing in care for this patient include:   Patient Care Team:  Maria G Ross MD as PCP - General (Internal Medicine)  Giulia Hairston PA-C as Assigned PCP  Nayana Morales RN as Clinic Care Coordinator (Primary Care - CC)    The following health maintenance items are reviewed in Epic and correct as of today:  Health Maintenance   Topic Date Due     DEXA  Never done     ZOSTER IMMUNIZATION (2 of 3) 02/26/2009     MAMMO SCREENING  08/22/2020     COVID-19 Vaccine (2 - Pfizer 3-dose booster series) 11/23/2021     FALL RISK ASSESSMENT  12/15/2021     MEDICARE ANNUAL WELLNESS VISIT  12/07/2022     COLORECTAL CANCER SCREENING  01/07/2023     LIPID  12/15/2025     ADVANCE CARE PLANNING  12/22/2025     DTAP/TDAP/TD IMMUNIZATION (3 - Td or Tdap) 04/02/2030     HEPATITIS C SCREENING  Completed     PHQ-2  Completed     INFLUENZA VACCINE  Completed     Pneumococcal Vaccine: 65+ Years  Completed     IPV IMMUNIZATION  Aged Out     MENINGITIS IMMUNIZATION  Aged Out     HEPATITIS B IMMUNIZATION  Aged Out     Lab work is in process  Pneumonia Vaccine: Series completed  Mammogram Screening: Mammogram Screening: Recommended mammography every 1-2 years with patient discussion and risk factor consideration    ROS:  CONSTITUTIONAL:  "NEGATIVE for fever, chills, change in weight  INTEGUMENTARY/SKIN: NEGATIVE for worrisome rashes, moles or lesions  EYES: NEGATIVE for vision changes or irritation  ENT/MOUTH: NEGATIVE for ear, mouth and throat problems  RESP: NEGATIVE for significant cough or SOB  BREAST: NEGATIVE for masses, tenderness or discharge  CV: NEGATIVE for chest pain, palpitations or peripheral edema  GI: NEGATIVE for nausea, abdominal pain, heartburn, or change in bowel habits  : NEGATIVE for frequency, dysuria, or hematuria  MUSCULOSKELETAL: NEGATIVE for significant arthralgias or myalgia  NEURO: NEGATIVE for weakness, dizziness or paresthesias  ENDOCRINE: NEGATIVE for temperature intolerance, skin/hair changes  HEME: NEGATIVE for bleeding problems  PSYCHIATRIC: NEGATIVE for changes in mood or affect    OBJECTIVE:   /86   Pulse 64   Temp 97.9  F (36.6  C)   Ht 1.644 m (5' 4.72\")   Wt 54 kg (119 lb)   SpO2 99%   BMI 19.97 kg/m   Estimated body mass index is 19.97 kg/m  as calculated from the following:    Height as of this encounter: 1.644 m (5' 4.72\").    Weight as of this encounter: 54 kg (119 lb).  EXAM:   GENERAL: healthy, alert and no distress  EYES: Eyes grossly normal to inspection, PERRL and conjunctivae and sclerae normal  HENT: ear canals and TM's normal, nose and mouth without ulcers or lesions  NECK: no adenopathy, no asymmetry, masses, or scars and thyroid normal to palpation  RESP: lungs clear to auscultation - no rales, rhonchi or wheezes  BREAST: normal without masses, tenderness or nipple discharge and no palpable axillary masses or adenopathy  CV: regular rate and rhythm, normal S1 S2, no S3 or S4, no murmur, click or rub, no peripheral edema and peripheral pulses strong  ABDOMEN: soft, nontender, no hepatosplenomegaly, no masses and bowel sounds normal  MS: no gross musculoskeletal defects noted, no edema  SKIN: no suspicious lesions or rashes  NEURO: Normal strength and tone, mentation intact and speech " normal  PSYCH: mentation appears normal, affect normal/bright  LYMPH: no cervical, supraclavicular, axillary, or inguinal adenopathy    Diagnostic Test Results:  Labs reviewed in Epic    ASSESSMENT / PLAN:     (Z00.00) Encounter for Medicare annual wellness exam  (primary encounter diagnosis)  Plan: Dexa hip/pelvis/spine*, Mammogram - routine         screening        Reviewed health maintenance recommendation.See recommendations below    (I10) Essential hypertension with goal blood pressure less than 140/90  Comment: well controlled  Plan: atenolol (TENORMIN) 100 MG tablet, lisinopril         (ZESTRIL) 30 MG tablet, CBC with Diff Plt         (LabDAQ) CMP  Meds refilld and maintenance labs as noted.            (E78.5) Hyperlipidemia LDL goal <100  Plan: Lipid Panel, pravastatin (PRAVACHOL) 10 MG         tablet, Comprehensive metabolic panel    (N95.2) Atrophic vaginitis  Plan: COMPOUNDED NON-CONTROLLED SUBSTANCE (CMPD RX) -        PHARMACY TO MIX COMPOUNDED MEDICATION        Has tried estrogen cream in the past but does not think it helped and may have made things worse.  She has concerns about increased risk for breast cancer.  This was discussed.  Can continue what she is using.  If symptoms not controlled recommend trying the estrogen cream again.    (M17.11) Primary osteoarthritis of right knee  Comment: Doing well following her second knee preplacement    (Z85.828) History of skin cancer  Comment: Continue with dermatology surveillance     (Z23) Need for vaccination        COUNSELING:  Reviewed preventive health counseling, as reflected in patient instructions  Special attention given to:       Regular exercise       Healthy diet/nutrition       Vision screening       Hearing screening       Dental care       Bladder control       Fall risk prevention       Osteoporosis prevention/bone health       Colon cancer screening       Advanced Planning     Estimated body mass index is 19.97 kg/m  as calculated from the  "following:    Height as of this encounter: 1.644 m (5' 4.72\").    Weight as of this encounter: 54 kg (119 lb).        She reports that she has never smoked. She has never used smokeless tobacco.    Appropriate preventive services were discussed with this patient, including applicable screening as appropriate for cardiovascular disease, diabetes, osteopenia/osteoporosis, and glaucoma.  As appropriate for age/gender, discussed screening for colorectal cancer, prostate cancer, breast cancer, and cervical cancer. Checklist reviewing preventive services available has been given to the patient.    Reviewed patients plan of care and provided an AVS. The Basic Care Plan (routine screening as documented in Health Maintenance) for Shana meets the Care Plan requirement. This Care Plan has been established and reviewed with the Patient.    Counseling Resources:  ATP IV Guidelines  Pooled Cohorts Equation Calculator  Breast Cancer Risk Calculator  BRCA-Related Cancer Risk Assessment: FHS-7 Tool  FRAX Risk Assessment  ICSI Preventive Guidelines  Dietary Guidelines for Americans, 2010  USDA's MyPlate  ASA Prophylaxis  Lung CA Screening    Giulia Hairston PA-C  [facility]  "

## 2021-12-07 ENCOUNTER — OFFICE VISIT (OUTPATIENT)
Dept: FAMILY MEDICINE | Facility: CLINIC | Age: 76
End: 2021-12-07
Payer: COMMERCIAL

## 2021-12-07 VITALS
SYSTOLIC BLOOD PRESSURE: 138 MMHG | HEART RATE: 64 BPM | BODY MASS INDEX: 19.83 KG/M2 | DIASTOLIC BLOOD PRESSURE: 86 MMHG | WEIGHT: 119 LBS | OXYGEN SATURATION: 99 % | TEMPERATURE: 97.9 F | HEIGHT: 65 IN

## 2021-12-07 DIAGNOSIS — M17.11 PRIMARY OSTEOARTHRITIS OF RIGHT KNEE: ICD-10-CM

## 2021-12-07 DIAGNOSIS — I10 ESSENTIAL HYPERTENSION WITH GOAL BLOOD PRESSURE LESS THAN 140/90: ICD-10-CM

## 2021-12-07 DIAGNOSIS — Z85.828 HISTORY OF SKIN CANCER: ICD-10-CM

## 2021-12-07 DIAGNOSIS — N95.2 ATROPHIC VAGINITIS: ICD-10-CM

## 2021-12-07 DIAGNOSIS — Z23 NEED FOR VACCINATION: ICD-10-CM

## 2021-12-07 DIAGNOSIS — E78.5 HYPERLIPIDEMIA LDL GOAL <100: ICD-10-CM

## 2021-12-07 DIAGNOSIS — Z00.00 ENCOUNTER FOR MEDICARE ANNUAL WELLNESS EXAM: Primary | ICD-10-CM

## 2021-12-07 LAB
ALBUMIN SERPL-MCNC: 4.2 G/DL (ref 3.4–5)
ALP SERPL-CCNC: 95 U/L (ref 40–150)
ALT SERPL W P-5'-P-CCNC: 19 U/L (ref 0–50)
ANION GAP SERPL CALCULATED.3IONS-SCNC: 4 MMOL/L (ref 3–14)
AST SERPL W P-5'-P-CCNC: 17 U/L (ref 0–45)
BASOPHILS # BLD AUTO: 0.1 10E3/UL (ref 0–0.2)
BASOPHILS NFR BLD AUTO: 1 %
BILIRUB SERPL-MCNC: 0.6 MG/DL (ref 0.2–1.3)
BUN SERPL-MCNC: 16 MG/DL (ref 7–30)
CALCIUM SERPL-MCNC: 9.8 MG/DL (ref 8.5–10.1)
CHLORIDE BLD-SCNC: 104 MMOL/L (ref 94–109)
CHOLEST SERPL-MCNC: 240 MG/DL
CO2 SERPL-SCNC: 30 MMOL/L (ref 20–32)
CREAT SERPL-MCNC: 0.83 MG/DL (ref 0.52–1.04)
EOSINOPHIL # BLD AUTO: 0.2 10E3/UL (ref 0–0.7)
EOSINOPHIL NFR BLD AUTO: 2 %
ERYTHROCYTE [DISTWIDTH] IN BLOOD BY AUTOMATED COUNT: 12.6 % (ref 10–15)
FASTING STATUS PATIENT QL REPORTED: NO
GFR SERPL CREATININE-BSD FRML MDRD: 70 ML/MIN/1.73M2
GLUCOSE BLD-MCNC: 98 MG/DL (ref 70–99)
HCT VFR BLD AUTO: 42 % (ref 35–47)
HDLC SERPL-MCNC: 73 MG/DL
HGB BLD-MCNC: 13.9 G/DL (ref 11.7–15.7)
IMM GRANULOCYTES # BLD: 0 10E3/UL
IMM GRANULOCYTES NFR BLD: 0 %
LDLC SERPL CALC-MCNC: 147 MG/DL
LYMPHOCYTES # BLD AUTO: 1.8 10E3/UL (ref 0.8–5.3)
LYMPHOCYTES NFR BLD AUTO: 27 %
MCH RBC QN AUTO: 30.2 PG (ref 26.5–33)
MCHC RBC AUTO-ENTMCNC: 33.1 G/DL (ref 31.5–36.5)
MCV RBC AUTO: 91 FL (ref 78–100)
MONOCYTES # BLD AUTO: 0.8 10E3/UL (ref 0–1.3)
MONOCYTES NFR BLD AUTO: 12 %
NEUTROPHILS # BLD AUTO: 3.9 10E3/UL (ref 1.6–8.3)
NEUTROPHILS NFR BLD AUTO: 58 %
NONHDLC SERPL-MCNC: 167 MG/DL
NRBC # BLD AUTO: 0 10E3/UL
NRBC BLD AUTO-RTO: 0 /100
PLATELET # BLD AUTO: 319 10E3/UL (ref 150–450)
POTASSIUM BLD-SCNC: 4.6 MMOL/L (ref 3.4–5.3)
PROT SERPL-MCNC: 8.1 G/DL (ref 6.8–8.8)
RBC # BLD AUTO: 4.6 10E6/UL (ref 3.8–5.2)
SODIUM SERPL-SCNC: 138 MMOL/L (ref 133–144)
TRIGL SERPL-MCNC: 99 MG/DL
WBC # BLD AUTO: 6.7 10E3/UL (ref 4–11)

## 2021-12-07 PROCEDURE — 82247 BILIRUBIN TOTAL: CPT | Performed by: PHYSICIAN ASSISTANT

## 2021-12-07 PROCEDURE — 82040 ASSAY OF SERUM ALBUMIN: CPT | Performed by: PHYSICIAN ASSISTANT

## 2021-12-07 PROCEDURE — 80061 LIPID PANEL: CPT | Performed by: PHYSICIAN ASSISTANT

## 2021-12-07 PROCEDURE — 85025 COMPLETE CBC W/AUTO DIFF WBC: CPT | Performed by: PHYSICIAN ASSISTANT

## 2021-12-07 RX ORDER — LISINOPRIL 30 MG/1
30 TABLET ORAL DAILY
Qty: 90 TABLET | Refills: 3 | Status: SHIPPED | OUTPATIENT
Start: 2021-12-07 | End: 2022-12-14

## 2021-12-07 RX ORDER — ATENOLOL 100 MG/1
100 TABLET ORAL DAILY
Qty: 90 TABLET | Refills: 3 | Status: SHIPPED | OUTPATIENT
Start: 2021-12-07 | End: 2022-12-14

## 2021-12-07 RX ORDER — PRAVASTATIN SODIUM 10 MG
10 TABLET ORAL DAILY
Qty: 90 TABLET | Refills: 3 | Status: SHIPPED | OUTPATIENT
Start: 2021-12-07 | End: 2021-12-08

## 2021-12-07 ASSESSMENT — MIFFLIN-ST. JEOR: SCORE: 1036.27

## 2021-12-08 DIAGNOSIS — E78.5 HYPERLIPIDEMIA LDL GOAL <100: ICD-10-CM

## 2021-12-08 RX ORDER — PRAVASTATIN SODIUM 20 MG
20 TABLET ORAL DAILY
Qty: 90 TABLET | Refills: 3 | Status: SHIPPED | OUTPATIENT
Start: 2021-12-08 | End: 2022-11-23

## 2022-08-24 ENCOUNTER — TRANSFERRED RECORDS (OUTPATIENT)
Dept: HEALTH INFORMATION MANAGEMENT | Facility: CLINIC | Age: 77
End: 2022-08-24

## 2022-10-22 ENCOUNTER — HEALTH MAINTENANCE LETTER (OUTPATIENT)
Age: 77
End: 2022-10-22

## 2022-11-22 DIAGNOSIS — E78.5 HYPERLIPIDEMIA LDL GOAL <100: ICD-10-CM

## 2022-11-23 RX ORDER — PRAVASTATIN SODIUM 20 MG
20 TABLET ORAL DAILY
Qty: 90 TABLET | Refills: 0 | Status: SHIPPED | OUTPATIENT
Start: 2022-11-23 | End: 2022-12-14

## 2022-11-23 NOTE — TELEPHONE ENCOUNTER
Medication requested: pravastatin (PRAVACHOL) 20 MG tablet  Last office visit: 12/7/21  [facility] appointments: 12/14/22  Medication last refilled: 12/8/21; 90+ 3 refill  Last qualifying labs:   Component      Latest Ref Rng & Units 12/7/2021   Cholesterol      <200 mg/dL 240 (H)   Triglycerides      <150 mg/dL 99   HDL Cholesterol      >=50 mg/dL 73   LDL Cholesterol Calculated      <=100 mg/dL 147 (H)   Non HDL Cholesterol      <130 mg/dL 167 (H)   Patient Fasting > 8hrs?       No     Medication is being filled for 1 time refill only due to:  Patient needs to be seen because it has been more than one year since last visit.    Lencho DEAN, RN  11/23/22 2:14 PM

## 2022-11-29 ENCOUNTER — OFFICE VISIT (OUTPATIENT)
Dept: INTERNAL MEDICINE | Facility: CLINIC | Age: 77
End: 2022-11-29
Payer: COMMERCIAL

## 2022-11-29 VITALS
SYSTOLIC BLOOD PRESSURE: 126 MMHG | TEMPERATURE: 97.5 F | BODY MASS INDEX: 21.16 KG/M2 | DIASTOLIC BLOOD PRESSURE: 84 MMHG | HEIGHT: 65 IN | HEART RATE: 53 BPM | WEIGHT: 127 LBS | OXYGEN SATURATION: 99 %

## 2022-11-29 DIAGNOSIS — Z01.818 ENCOUNTER FOR PREOPERATIVE ASSESSMENT: Primary | ICD-10-CM

## 2022-11-29 DIAGNOSIS — M19.071 ARTHRITIS OF FIRST METATARSOPHALANGEAL (MTP) JOINT OF RIGHT FOOT: ICD-10-CM

## 2022-11-29 PROCEDURE — 99214 OFFICE O/P EST MOD 30 MIN: CPT | Performed by: INTERNAL MEDICINE

## 2022-11-29 NOTE — PROGRESS NOTES
[address] [address]  Phone: [phone]  Primary Provider: Maria G Ross  Pre-op Performing Provider: NANCY WHARTON    PREOPERATIVE EVALUATION:  Today's date: 11/29/2022    Shana Us is a 76 year old female who presents for a preoperative evaluation.    Surgical Information:  Surgery/Procedure: R first MTP joint fusion  Surgery Location: [facility]   Surgeon: Dr. Hakwins   Surgery Date: 11/30/2022  Time of Surgery: 11am   Where patient plans to recover: At home with family  Fax number for surgical facility: [phone]    Type of Anesthesia Anticipated: to be determined    Assessment & Plan   The proposed surgical procedure is considered INTERMEDIATE risk.    Encounter for preoperative assessment  Arthritis of first metatarsophalangeal (MTP) joint of right foot  Approval given to proceed with proposed procedure without further diagnostic evaluation. Most recent labs reviewed. Able to perform >4 METS without symptoms. Medications were reviewed in detail today. On the morning of surgery, take all normal medications with a small sip of water. Resume all medications post-operatively at the same doses unless otherwise directed by surgical care team.    RECOMMENDATION:  APPROVAL GIVEN to proceed with proposed procedure, without further diagnostic evaluation.    Subjective   HPI related to upcoming procedure: Shana presents for a pre-op exam. This is the first time I have met Shana. She has no other complaints. She reports no personal history of cardiac disease. She is able to walk up a flight of stairs without stopping, getting short of breath, or developing chest pain.    Preop Questions 11/29/2022   1. Have you ever had a heart attack or stroke? No   2. Have you ever had surgery on your heart or blood vessels, such as a stent placement, a coronary artery bypass, or surgery on an artery in your head, neck, heart, or legs? No   3.  Do you have chest pain with activity? No   4. Do you have a history of  heart failure? No   5. Do you currently have a cold, bronchitis or symptoms of other infection? No   6. Do you have a cough, shortness of breath, or wheezing? No   7. Do you or anyone in your family have previous history of blood clots? No   8. Do you or does anyone in your family have a serious bleeding problem such as prolonged bleeding following surgeries or cuts? No   9. Have you ever had problems with anemia or been told to take iron pills? No   10. Have you had any abnormal blood loss such as black, tarry or bloody stools, or abnormal vaginal bleeding? No   11. Have you ever had a blood transfusion? No   12. Are you willing to have a blood transfusion if it is medically needed before, during, or after your surgery? Yes   13. Have you or any of your relatives ever had problems with anesthesia? No   14. Do you have sleep apnea, excessive snoring or daytime drowsiness? No   15. Do you have any artifical heart valves or other implanted medical devices like a pacemaker, defibrillator, or continuous glucose monitor? No   16. Do you have artificial joints? YES   17. Are you allergic to latex? No     Health Care Directive:  Patient does not have a Health Care Directive or Living Will: Patient states has Advance Directive and will bring in a copy to clinic.    Preoperative Review of :   reviewed - no record of controlled substances prescribed.    Review of Systems  10pt ROS reviewed and all other systems negative unless otherwise stated above.    Patient Active Problem List    Diagnosis Date Noted     SCC (squamous cell carcinoma), face 09/01/2021     Priority: Medium     Followed by Dr. Reyes       Chronic pain of right knee 05/27/2021     Priority: Medium     Hyperlipidemia LDL goal <100 10/10/2017     Priority: Medium     Basal cell adenoma 09/28/2017     Priority: Medium     Three occurrences.  Under surveillance with dermatology Q6  months in [place].       Essential hypertension with goal blood pressure less than 140/90 09/09/2016     Priority: Medium     Osteoarthritis of knee 10/01/2013     Priority: Medium     R>L       Seasonal allergies 10/01/2013     Priority: Medium     Nonallergic rhinitis, uses saline wash       Atrophic vaginitis 10/01/2013     Priority: Medium      Past Medical History:   Diagnosis Date     Atrophic vaginitis      Basal cell adenoma      Hyperlipidemia      Hypertension      Osteoarthritis      Seasonal allergies      Past Surgical History:   Procedure Laterality Date     BREAST BIOPSY, RT/LT      right breast , benign     COLONOSCOPY  2006     FOOT SURGERY       KNEE SURGERY Right 1994     left total knee replacement Left      MOHS MICROGRAPHIC PROCEDURE       right total knee replac Right 09/08/2021     Current Outpatient Medications   Medication Sig Dispense Refill     atenolol (TENORMIN) 100 MG tablet Take 1 tablet (100 mg) by mouth daily 90 tablet 3     cholecalciferol (VITAMIN  -D) 1000 UNITS capsule Take 1 capsule by mouth daily        lisinopril (ZESTRIL) 30 MG tablet Take 1 tablet (30 mg) by mouth daily 90 tablet 3     pravastatin (PRAVACHOL) 20 MG tablet Take 1 tablet (20 mg) by mouth daily 90 tablet 0     COMPOUNDED NON-CONTROLLED SUBSTANCE (CMPD RX) - PHARMACY TO MIX COMPOUNDED MEDICATION Estradiol 0.02% in HRT cream Apply 2 grams,  intravaginally and small amount externally daily for one week then twice weekly for maintenance. (Patient not taking: Reported on 11/29/2022) 30 g 3     Allergies   Allergen Reactions     Oxycodone Nausea and Vomiting     Tolerated dilaudid      Social History     Tobacco Use     Smoking status: Never     Smokeless tobacco: Never   Substance Use Topics     Alcohol use: Yes     Alcohol/week: 7.0 standard drinks     Types: 7 Standard drinks or equivalent per week     History   Drug Use No         Objective   /84   Pulse 53   Temp 97.5  F (36.4  C) (Tympanic)   Ht 1.645  "m (5' 4.75\")   Wt 57.6 kg (127 lb)   SpO2 99%   BMI 21.30 kg/m      Physical Exam  GENERAL: alert and in no distress.  EYES: conjunctivae/corneas clear. EOMs grossly intact  HENT: Facies symmetric.  RESP: CTAB, no w/r/r.  CV: RRR, no m/r/g.  GI: NT, ND, without rebound tenderness or guarding  MSK: No edema. Moves all four extremities freely.  SKIN: No significant ulcers, lesions, or rashes on the visualized portions of the skin  NEURO: CN II-XII grossly intact.    Recent Labs   Lab Test 12/07/21  1125 09/03/21  0907 09/01/21  1447   HGB 13.9  --  13.3     --  295    134  --    POTASSIUM 4.6 5.2  --    CR 0.83 0.87  --       Diagnostics:  No labs were ordered during this visit.   No EKG required, no history of coronary heart disease, significant arrhythmia, peripheral arterial disease or other structural heart disease.    Revised Cardiac Risk Index (RCRI):  The patient has the following serious cardiovascular risks for perioperative complications:   - No serious cardiac risks = 0 points     RCRI Interpretation: 0 points: Class I (very low risk - 0.4% complication rate)    Signed Electronically by: Don Zepeda MD  Copy of this evaluation report is provided to requesting physician.  "

## 2022-12-12 DIAGNOSIS — I10 ESSENTIAL HYPERTENSION WITH GOAL BLOOD PRESSURE LESS THAN 140/90: ICD-10-CM

## 2022-12-12 RX ORDER — ATENOLOL 100 MG/1
100 TABLET ORAL DAILY
Qty: 30 TABLET | Refills: 0 | Status: CANCELLED | OUTPATIENT
Start: 2022-12-12

## 2022-12-13 NOTE — PATIENT INSTRUCTIONS
Shingles vaccines --go to pharmacy  Series of 2    Alpha chocolate supplement  Calcium 1200mg per day  Vitamin D 1000 international unit(s) daily    Patient Education   Personalized Prevention Plan  You are due for the preventive services outlined below.  Your care team is available to assist you in scheduling these services.  If you have already completed any of these items, please share that information with your care team to update in your medical record.  Health Maintenance Due   Topic Date Due    Osteoporosis Screening  Never done    Zoster (Shingles) Vaccine (2 of 3) 02/26/2009    COVID-19 Vaccine (3 - Booster for Pfizer series) 08/01/2022    FALL RISK ASSESSMENT  12/07/2022    Colorectal Cancer Screening  01/07/2023     Preventive Health Recommendations    See your health care provider every year to  Review health changes.   Discuss preventive care.    Review your medicines if your doctor has prescribed any.  You no longer need a yearly Pap test unless you've had an abnormal Pap test in the past 10 years. If you have vaginal symptoms, such as bleeding or discharge, be sure to talk with your provider about a Pap test.  Every 1 to 2 years, have a mammogram.  If you are over 69, talk with your health care provider about whether or not you want to continue having screening mammograms.  Every 10 years, have a colonoscopy. Or, have a yearly FIT test (stool test). These exams will check for colon cancer.   Have a cholesterol test every 5 years, or more often if your doctor advises it.   Have a diabetes test (fasting glucose) every three years. If you are at risk for diabetes, you should have this test more often.   At age 65, have a bone density scan (DEXA) to check for osteoporosis (brittle bone disease).    Shots:  Get a flu shot each year.  Get a tetanus shot every 10 years.  Talk to your doctor about your pneumonia vaccines. There are now two you should receive - Pneumovax (PPSV 23) and Prevnar (PCV 13).  Talk  to your pharmacist about the shingles vaccine.  Talk to your doctor about the hepatitis B vaccine.    Nutrition:   Eat at least 5 servings of fruits and vegetables each day.  Eat whole-grain bread, whole-wheat pasta and brown rice instead of white grains and rice.  Get adequate Calcium and Vitamin D.     Lifestyle  Exercise at least 150 minutes a week (30 minutes a day, 5 days a week). This will help you control your weight and prevent disease.  Limit alcohol to one drink per day.  No smoking.   Wear sunscreen to prevent skin cancer.   See your dentist twice a year for an exam and cleaning.  See your eye doctor every 1 to 2 years to screen for conditions such as glaucoma, macular degeneration and cataracts.    Personalized Prevention Plan  You are due for the preventive services outlined below.  Your care team is available to assist you in scheduling these services.  If you have already completed any of these items, please share that information with your care team to update in your medical record.  Health Maintenance   Topic Date Due    DANISHA  Never done    ZOSTER IMMUNIZATION (2 of 3) 02/26/2009    COVID-19 Vaccine (3 - Booster for Pfizer series) 08/01/2022    FALL RISK ASSESSMENT  12/07/2022    COLORECTAL CANCER SCREENING  01/07/2023    MEDICARE ANNUAL WELLNESS VISIT  12/14/2023    LIPID  12/07/2026    ADVANCE CARE PLANNING  12/07/2026    DTAP/TDAP/TD IMMUNIZATION (3 - Td or Tdap) 04/02/2030    HEPATITIS C SCREENING  Completed    PHQ-2 (once per calendar year)  Completed    INFLUENZA VACCINE  Completed    Pneumococcal Vaccine: 65+ Years  Completed    IPV IMMUNIZATION  Aged Out    MENINGITIS IMMUNIZATION  Aged Out    MAMMO SCREENING  Discontinued

## 2022-12-14 ENCOUNTER — OFFICE VISIT (OUTPATIENT)
Dept: FAMILY MEDICINE | Facility: CLINIC | Age: 77
End: 2022-12-14
Payer: COMMERCIAL

## 2022-12-14 VITALS
SYSTOLIC BLOOD PRESSURE: 132 MMHG | DIASTOLIC BLOOD PRESSURE: 80 MMHG | OXYGEN SATURATION: 99 % | HEART RATE: 59 BPM | TEMPERATURE: 97.1 F

## 2022-12-14 DIAGNOSIS — E83.52 HYPERCALCEMIA: ICD-10-CM

## 2022-12-14 DIAGNOSIS — E78.5 HYPERLIPIDEMIA LDL GOAL <100: ICD-10-CM

## 2022-12-14 DIAGNOSIS — Z12.31 VISIT FOR SCREENING MAMMOGRAM: ICD-10-CM

## 2022-12-14 DIAGNOSIS — I10 ESSENTIAL HYPERTENSION WITH GOAL BLOOD PRESSURE LESS THAN 140/90: Chronic | ICD-10-CM

## 2022-12-14 DIAGNOSIS — Z00.00 ENCOUNTER FOR MEDICARE ANNUAL WELLNESS EXAM: Primary | ICD-10-CM

## 2022-12-14 LAB
ALBUMIN SERPL BCG-MCNC: 4.8 G/DL (ref 3.5–5.2)
ALP SERPL-CCNC: 75 U/L (ref 35–104)
ALT SERPL W P-5'-P-CCNC: 18 U/L (ref 10–35)
ANION GAP SERPL CALCULATED.3IONS-SCNC: 15 MMOL/L (ref 7–15)
AST SERPL W P-5'-P-CCNC: 21 U/L (ref 10–35)
BILIRUB SERPL-MCNC: 0.5 MG/DL
BUN SERPL-MCNC: 19.8 MG/DL (ref 8–23)
CALCIUM SERPL-MCNC: 10.3 MG/DL (ref 8.8–10.2)
CHLORIDE SERPL-SCNC: 100 MMOL/L (ref 98–107)
CHOLEST SERPL-MCNC: 246 MG/DL
CREAT SERPL-MCNC: 0.92 MG/DL (ref 0.51–0.95)
DEPRECATED HCO3 PLAS-SCNC: 24 MMOL/L (ref 22–29)
GFR SERPL CREATININE-BSD FRML MDRD: 64 ML/MIN/1.73M2
GLUCOSE SERPL-MCNC: 89 MG/DL (ref 70–99)
HDLC SERPL-MCNC: 73 MG/DL
LDLC SERPL CALC-MCNC: 151 MG/DL
NONHDLC SERPL-MCNC: 173 MG/DL
POTASSIUM SERPL-SCNC: 4.4 MMOL/L (ref 3.4–5.3)
PROT SERPL-MCNC: 7.4 G/DL (ref 6.4–8.3)
SODIUM SERPL-SCNC: 139 MMOL/L (ref 136–145)
TRIGL SERPL-MCNC: 112 MG/DL

## 2022-12-14 RX ORDER — ATENOLOL 100 MG/1
100 TABLET ORAL DAILY
Qty: 90 TABLET | Refills: 3 | Status: SHIPPED | OUTPATIENT
Start: 2022-12-14 | End: 2023-12-22

## 2022-12-14 RX ORDER — PRAVASTATIN SODIUM 20 MG
20 TABLET ORAL DAILY
Qty: 90 TABLET | Refills: 3 | Status: SHIPPED | OUTPATIENT
Start: 2022-12-14 | End: 2022-12-15

## 2022-12-14 RX ORDER — LISINOPRIL 30 MG/1
30 TABLET ORAL DAILY
Qty: 90 TABLET | Refills: 3 | Status: SHIPPED | OUTPATIENT
Start: 2022-12-14 | End: 2023-12-26

## 2022-12-14 NOTE — PROGRESS NOTES
Shana Us is a 76 yo woman with hx of osteoarthritis, seasonal allergies, hypertension, hyperlipidemia, squamous cell cancer. She is here for a preventive exam.  She is not fasting. She is due for eye exams and up to date on dental visits. Wears seat belt-yes. Bike helmet- NA.       HCM  Advanced Directive: none on file, will return copy to us  COVID vaccine series: Pfizer initial series, due for Bivalent booster  Other vaccinations Flu, pneumococcal, Td completed, due for Shingrix series  Mammogram: last done 2018 goes to [facility] for imaging  Colonoscopy: had cologuard last time, 2020, declines for now, due 2023  DEXA --defer as she would decline treatment    Hearing concerns: no  Fall Risk: no  Independent at home: yes  Safe : yes  Memory concerns: no    COGNITIVE SCREEN  1) Repeat 3 items (Banana, Sunrise, Chair)    2) Clock draw: NORMAL  3) 3 item recall: Recalls 3 objects  Results: 3 items recalled: COGNITIVE IMPAIRMENT LESS LIKELY    Mini-CogTM Copyright VAISHALI Foss. Licensed by the author for use in [facility]; reprinted with permission ([email or web address].[email or web address]). All rights reserved.      Diet: meat, wide variety  Wt Readings from Last 4 Encounters:   11/29/22 57.6 kg (127 lb)   12/07/21 54 kg (119 lb)   09/01/21 54.9 kg (121 lb 1.9 oz)   08/26/21 55.8 kg (123 lb)     There is no height or weight on file to calculate BMI.--declined height and weight today    HTN  On lisinopril 30mg daily and atenolol 100mg daily.  No chest pain or palpitations. She plays tennis, no exercise intolerance.   BP Readings from Last 3 Encounters:   12/14/22 132/80   11/29/22 126/84   12/07/21 138/86     Hyperlipidemia  Shana takes pravastatin 20mg daily. She is consistent with medication. Active, plays tennis. No hx of smoking, BP in good control.  Recent Labs   Lab Test 12/07/21  1125 12/15/20  1000 10/08/19  1330   CHOL 240* 221.0* 226.0*   HDL 73 77.0 80.0   * 122.0 126.0   TRIG 99 114.0 97.0   CHOLHDLRATIO  --  2.9 2.8            Health Maintenance   Topic Date Due     DEXA  Never done     ZOSTER IMMUNIZATION (2 of 3) 02/26/2009     COVID-19 Vaccine (3 - Booster for Pfizer series) 08/01/2022     FALL RISK ASSESSMENT  12/07/2022     COLORECTAL CANCER SCREENING  01/07/2023     MEDICARE ANNUAL WELLNESS VISIT  12/14/2023     LIPID  12/07/2026     ADVANCE CARE PLANNING  12/07/2026     DTAP/TDAP/TD IMMUNIZATION (3 - Td or Tdap) 04/02/2030     HEPATITIS C SCREENING  Completed     PHQ-2 (once per calendar year)  Completed     INFLUENZA VACCINE  Completed     Pneumococcal Vaccine: 65+ Years  Completed     IPV IMMUNIZATION  Aged Out     MENINGITIS IMMUNIZATION  Aged Out     MAMMO SCREENING  Discontinued         Patient Active Problem List   Diagnosis     Osteoarthritis of knee     Seasonal allergies     Atrophic vaginitis     Essential hypertension with goal blood pressure less than 140/90     Basal cell adenoma     Hyperlipidemia LDL goal <100     Chronic pain of right knee     SCC (squamous cell carcinoma), face       Past Surgical History:   Procedure Laterality Date     BREAST BIOPSY, RT/LT      right breast , benign     COLONOSCOPY  2006     FOOT SURGERY       KNEE SURGERY Right 1994     left total knee replacement Left      MOHS MICROGRAPHIC PROCEDURE       right total knee replac Right 09/08/2021       Family History   Problem Relation Age of Onset     Breast Cancer Mother 45     High cholesterol Mother      Prostate Cancer Father      High cholesterol Father      Hypertension Father      Hypertension Sister      Hyperlipidemia Sister        Social  , 3 kids, 5 grandkids    HABITS:  Tob: never  ETOH: 1 per day, 2 on weekends  Calcium: not consistent  Caffeine: 2 perday  Exercise: tennis    OB/GYN HISTORY:  LMP: postmenopausal, , no vaginal bleeding  Trial of estrogen cream , messy, itchy, declines Rx  Vasomotor sx:  no  G 3   P 3   A 0  Self Breast exam:  yes    Current Outpatient Medications   Medication Sig Dispense Refill      atenolol (TENORMIN) 100 MG tablet Take 1 tablet (100 mg) by mouth daily 90 tablet 3     cholecalciferol (VITAMIN  -D) 1000 UNITS capsule Take 1 capsule by mouth daily        lisinopril (ZESTRIL) 30 MG tablet Take 1 tablet (30 mg) by mouth daily 90 tablet 3     pravastatin (PRAVACHOL) 20 MG tablet Take 1 tablet (20 mg) by mouth daily 90 tablet 0     Allergies   Allergen Reactions     Oxycodone Nausea and Vomiting     Tolerated dilaudid         ROS  CONSTITUTIONAL:NEGATIVE for fever, chills, change in weight  INTEGUMENTARY/SKIN: NEGATIVE for worrisome rashes, moles or lesions, hx of skin cancer  EYES: NEGATIVE for vision changes or irritation  ENT/MOUTH: NEGATIVE for ear, mouth and throat problems  RESP:NEGATIVE for significant cough or SOB  BREAST: NEGATIVE for masses, tenderness or discharge  CV: NEGATIVE for chest pain, palpitations, CASTILLO, orthopnea, PND  or peripheral edema  GI: NEGATIVE for nausea, abdominal pain, heartburn, or change in bowel habits  :NEGATIVE for frequency, dysuria, or hematuria  MUSCULOSKELETAL:NEGATIVE for significant arthralgias or myalgia  NEURO: NEGATIVE for weakness, dizziness or paresthesias  ENDOCRINE: NEGATIVE for polyuria/dipsia,  temperature intolerance, skin/hair changes  HEME/ALLERGY/IMMUNE: NEGATIVE for bleeding problems  PSYCHIATRIC: NEGATIVE for changes in mood or affect    EXAM  /80   Pulse 59   Temp 97.1  F (36.2  C)   SpO2 99%       GENERAL APPEARANCE: Alert, pleasant, NAD  EYES: PERRL, EOMI, conjunctiva clear  HENT: TM normal bilaterally. Nose and mouth masked  NECK: no adenopathy, thyroid normal to palpation  RESP: lungs clear to auscultation bilaterally  CV: regular rate and rhythm, normal S1 S2, no murmur, no carotid bruits  ABDOMEN: soft, nontender, without HSM or masses. Bowel sounds normal  MS: extremities normal- no gross deformities noted, no tender, hot or swollen joints.    SKIN: no suspicious lesions or rashes  NEURO: Normal strength and tone,  sensory exam grossly normal, DTR normoreflexive in upper and lower extremities  PSYCH: mentation appears normal. and affect normal/bright.  EXT: no peripheral edema, pedal pulses palpable    Assessment:  (Z00.00) Encounter for Medicare annual wellness exam  (primary encounter diagnosis)  Comment: 77 year old woman in good health  Plan: Today we discussed ways to maintain a healthy lifestyle with sensible eating, regular exercise and self cares. We dicussed calcium and Vitamin D intake, vaccinations and preventive health screens.  Recommend COVID bivalent booster and Shingrix series at pharmacy.     (E78.5) Hyperlipidemia LDL goal <100  Comment: On pravastatin 20mg daily. LDL above target range  Recent Labs   Lab Test 12/14/22  1438 12/07/21  1125 12/15/20  1000 10/08/19  1330   CHOL 246* 240* 221.0* 226.0*   HDL 73 73 77.0 80.0   * 147* 122.0 126.0   TRIG 112 99 114.0 97.0   CHOLHDLRATIO  --   --  2.9 2.8     Plan: Lipid Profile, Comprehensive metabolic panel,         Lipid Profile, atorvastatin (LIPITOR) 10 MG         tablet, DISCONTINUED: pravastatin (PRAVACHOL)         20 MG tablet        Discontinue pravastatin, start atorvastatin 10mg , repeat labs in 6 wks    (Z12.31) Visit for screening mammogram  Comment: routine mammogram is due, she goes to [facility] imaging  Plan: MA Screen Bilateral w/Charan        Referral will be faxed to [facility]    (I10) Essential hypertension with goal blood pressure less than 140/90  Comment: BP in target range  Plan: lisinopril (ZESTRIL) 30 MG tablet, atenolol         (TENORMIN) 100 MG tablet, Comprehensive         metabolic panel        Refilled medications    (E83.52) Hypercalcemia  Comment: incidental finding of elevated calcium, 10.3  Plan: Calcium, Parathyroid Hormone Intact, Vitamin D         Deficiency        Recommend she return for Calcium, PTH and Vitamin D level.    Maria G Ross MD  Internal Medicine/Pediatrics

## 2022-12-15 RX ORDER — ATORVASTATIN CALCIUM 10 MG/1
10 TABLET, FILM COATED ORAL DAILY
Qty: 90 TABLET | Refills: 3 | Status: SHIPPED | OUTPATIENT
Start: 2022-12-15 | End: 2023-12-19

## 2023-12-06 ENCOUNTER — TRANSFERRED RECORDS (OUTPATIENT)
Dept: HEALTH INFORMATION MANAGEMENT | Facility: CLINIC | Age: 78
End: 2023-12-06
Payer: COMMERCIAL

## 2023-12-06 LAB — MAMMOGRAM: NORMAL

## 2023-12-19 NOTE — PATIENT INSTRUCTIONS
Calcium 1200mg divided into 2 doses  Vitamin D 3008-2970 international unit(s) daily    maintenance  Replens moisturizer use 2-3x per week  Aquaphor   Vanicream    Use separate lubricants for intercourse      Patient Education   Personalized Prevention Plan  You are due for the preventive services outlined below.  Your care team is available to assist you in scheduling these services.  If you have already completed any of these items, please share that information with your care team to update in your medical record.  Health Maintenance Due   Topic Date Due    Osteoporosis Screening  Never done    RSV VACCINE (Pregnancy & 60+) (1 - 1-dose 60+ series) Never done    Zoster (Shingles) Vaccine (2 of 3) 02/26/2009    FALL RISK ASSESSMENT  12/07/2022    PHQ-2 (once per calendar year)  01/01/2023    Colorectal Cancer Screening  01/07/2023    Flu Vaccine (1) 09/01/2023    COVID-19 Vaccine (9 - 2023-24 season) 09/01/2023

## 2023-12-19 NOTE — PROGRESS NOTES
Shana Us is a 78 year old female with hx of osteoarthritis, seasonal allergies, hypertension, hyperlipidemia, squamous cell cancer. .  She is here for a general check up.  She is not fasting. She is up to date on eye exams and due for dental visits.    HCM  Advanced directive: none on file  COVID vaccine completed  Second shingrix #2, at pharmacy and get RSV at pharmacy  Flu completed  RSV at pharmacy, others up to date  Colon cancer screening Cologuard due 2023, no longer indicated  Mammogram last done 2018, went to [facility] for imaging 12/8/2023, declines breast exam today  DEXA 12/2021 --defers imaging as she would decline treatment    Medicare questions:  Hearing concerns: No  Fall Risk: No  Independent at home: Yes  Safe : Yes  Memory concerns: No    COGNITIVE SCREEN  1) Repeat 3 items (Banana, Sunrise, Chair)    2) Clock draw: NORMAL  3) 3 item recall: Recalls 3 objects  Results: 3 items recalled: COGNITIVE IMPAIRMENT LESS LIKELY    Mini-CogTM Copyright S Pipo. Licensed by the author for use in [employer]; reprinted with permission ([email or web address]@[email or web address]). All rights reserved.        Diet: meat , wide variety  Wt Readings from Last 4 Encounters:   12/20/23 56.7 kg (125 lb)   11/29/22 57.6 kg (127 lb)   12/07/21 54 kg (119 lb)   09/01/21 54.9 kg (121 lb 1.9 oz)     Body mass index is 21.12 kg/m .    HTN  Lisinopril 30mg daily + atenolol 100mg daily.    Has home BP machine, not currently taking readings  Has doctor in [place] and will be leaving in Jan for 5 mos  No chest pain or palpitations. She plays tennis, no exercise intolerance.     BP Readings from Last 3 Encounters:   12/20/23 (!) 151/96   12/14/22 132/80   11/29/22 126/84     Hyperlipidemia  Shana previously took pravastatin 20mg daily.   Changed to atorvastatin one year ago  Needs lipid panel today  Consistent with medication.   Active, plays tennis. No hx of smoking, BP in good control.    Recent Labs   Lab Test 12/14/22  1438 12/07/21  1125  12/15/20  1000 10/08/19  1330   CHOL 246* 240* 221.0* 226.0*   HDL 73 73 77.0 80.0   * 147* 122.0 126.0   TRIG 112 99 114.0 97.0   CHOLHDLRATIO  --   --  2.9 2.8         PMH, PSH, FH, medications, allergies and immunizations are updated this visit.    Social  , 3 kids, 5 grandkids  [place] Jan - May each year     HABITS:  Tob: never  ETOH: 1 per day  Calcium: just started Calcium supplement/+D chews  Caffeine: 2 per day  Exercise: tennis , golf , ski     OB/GYN HISTORY:  LMP: postmenopausal, no vaginal bleeding  Trial of estrogen cream , messy,  declines Rx , uses Replens  Vasomotor sx:  no  G 3   P 3   A 0  Self Breast exam:  yes      Current Outpatient Medications   Medication Sig Dispense Refill    atenolol (TENORMIN) 100 MG tablet Take 1 tablet (100 mg) by mouth daily 90 tablet 3    atorvastatin (LIPITOR) 10 MG tablet Take 1 tablet (10 mg) by mouth daily 90 tablet 3    cholecalciferol (VITAMIN  -D) 1000 UNITS capsule Take 1 capsule by mouth daily       lisinopril (ZESTRIL) 30 MG tablet Take 1 tablet (30 mg) by mouth daily 90 tablet 3     Allergies   Allergen Reactions    Oxycodone Nausea and Vomiting     Tolerated dilaudid         ROS  CONSTITUTIONAL:NEGATIVE for fever, chills, change in weight  INTEGUMENTARY/SKIN: NEGATIVE for worrisome rashes, moles or lesions. Sees a dermatologist, q  6 mos. No lesions removed in past year.   EYES: NEGATIVE for vision changes or irritation  ENT/MOUTH: NEGATIVE for ear, mouth and throat problems  RESP:NEGATIVE for significant cough or SOB  CV: NEGATIVE for chest pain, palpitations, CASTILLO, orthopnea, PND  or peripheral edema  GI: NEGATIVE for nausea, abdominal pain, heartburn, or change in bowel habits  :NEGATIVE for frequency, dysuria, or hematuria  MUSCULOSKELETAL:NEGATIVE for significant arthralgias or myalgia s/p knee replacements doing well, has bilateral outer hip pain R>L  NEURO: NEGATIVE for weakness, dizziness or paresthesias  ENDOCRINE: NEGATIVE for  "polyuria/dipsia,  temperature intolerance, skin/hair changes  HEME/ALLERGY/IMMUNE: NEGATIVE for bleeding problems  PSYCHIATRIC: NEGATIVE for changes in mood or affect    EXAM  BP (!) 151/96 (BP Location: Right arm, Patient Position: Sitting, Cuff Size: Adult Regular)   Pulse 76   Temp 97.2  F (36.2  C) (Skin)   Resp 15   Ht 1.638 m (5' 4.5\")   Wt 56.7 kg (125 lb)   SpO2 97%   BMI 21.12 kg/m    GENERAL APPEARANCE: Alert, pleasant, NAD  EYES: PERRL, EOMI, conjunctiva clear  HENT: TM normal bilaterally. Nose and mouth without lesions  NECK: no adenopathy, thyroid normal to palpation  RESP: lungs clear to auscultation bilaterally  BREAST: declined  CV: regular rate and rhythm, normal S1 S2, no murmur, no carotid bruits  ABDOMEN: soft, nontender, without HSM or masses. Bowel sounds normal  Hips with excellent internal,external rotation +point tenderness at lateral hips (bursae) R>L  SKIN: no suspicious lesions or rashes  NEURO: Normal strength and tone, sensory exam grossly normal, DTR normoreflexive in upper and lower extremities  PSYCH: mentation appears normal. and affect normal/bright.  EXT: no peripheral edema, pedal pulses palpable    Assessment:  (Z00.00) Encounter for Medicare annual wellness exam  (primary encounter diagnosis)  Comment: 78 year old woman in good health  Plan: Today we discussed ways to maintain a healthy lifestyle with sensible eating, regular exercise and self cares. We dicussed calcium and Vitamin D intake, vaccinations and preventive health screens.    Recently had mammogram at [facility], normal. Declines DEXA as she would not treat with medication    (E78.5) Hyperlipidemia LDL goal <100  Comment: improved LDL with use of atorvastatin, LDL goal is <100. I recommended increasing to 20mg dose but she indicates she rather stay at 10mg and make lifestyle changes  Recent Labs   Lab Test 12/20/23  1351 12/14/22  1438 12/07/21  1125 12/15/20  1000 10/08/19  1330   CHOL 203* 246*   < > 221.0* 226.0* "   HDL 72 73   < > 77.0 80.0   * 151*   < > 122.0 126.0   TRIG 85 112   < > 114.0 97.0   CHOLHDLRATIO  --   --   --  2.9 2.8    < > = values in this interval not displayed.   Plan: Lipid Profile, atorvastatin (LIPITOR) 10 MG , Atorvastatin 20mg dose        tablet, Comprehensive metabolic panel        May cut 20mg dose in half for 10mg daily dose. Recheck in one year    (I10) Essential hypertension with goal blood pressure less than 140/90  Comment: usually BP in target range but high today, my second reading was 150/86  BP Readings from Last 3 Encounters:   12/20/23 (!) 151/96   12/14/22 132/80   11/29/22 126/84   Plan: Comprehensive metabolic panel        Recommend she check BP on her home monitor, return to clinic in 2-4 wk and bring machine with her. Currently on atenolol 100mg + lisinopril 30mg daily  May need to increase lisinopril to 40mg daily dose    (M70.61,  M70.62) Trochanteric bursitis of both hips  Comment: point tenderness over lateral hips  Plan: Physical Therapy Referral        Gave handout on this condition, recommend rest, ice, analgesics and PT    Maria G Ross MD  Internal Medicine/Pediatrics

## 2023-12-20 ENCOUNTER — OFFICE VISIT (OUTPATIENT)
Dept: FAMILY MEDICINE | Facility: CLINIC | Age: 78
End: 2023-12-20
Payer: COMMERCIAL

## 2023-12-20 VITALS
BODY MASS INDEX: 20.83 KG/M2 | DIASTOLIC BLOOD PRESSURE: 96 MMHG | WEIGHT: 125 LBS | SYSTOLIC BLOOD PRESSURE: 151 MMHG | RESPIRATION RATE: 15 BRPM | HEIGHT: 65 IN | HEART RATE: 76 BPM | TEMPERATURE: 97.2 F | OXYGEN SATURATION: 97 %

## 2023-12-20 DIAGNOSIS — E78.5 HYPERLIPIDEMIA LDL GOAL <100: ICD-10-CM

## 2023-12-20 DIAGNOSIS — Z00.00 ENCOUNTER FOR MEDICARE ANNUAL WELLNESS EXAM: Primary | ICD-10-CM

## 2023-12-20 DIAGNOSIS — M70.61 TROCHANTERIC BURSITIS OF BOTH HIPS: ICD-10-CM

## 2023-12-20 DIAGNOSIS — I10 ESSENTIAL HYPERTENSION WITH GOAL BLOOD PRESSURE LESS THAN 140/90: Chronic | ICD-10-CM

## 2023-12-20 DIAGNOSIS — M70.62 TROCHANTERIC BURSITIS OF BOTH HIPS: ICD-10-CM

## 2023-12-20 RX ORDER — ATENOLOL 100 MG/1
100 TABLET ORAL DAILY
Qty: 90 TABLET | Refills: 3 | Status: CANCELLED | OUTPATIENT
Start: 2023-12-20

## 2023-12-20 RX ORDER — ATORVASTATIN CALCIUM 10 MG/1
10 TABLET, FILM COATED ORAL DAILY
Qty: 90 TABLET | Refills: 3 | Status: SHIPPED | OUTPATIENT
Start: 2023-12-20 | End: 2023-12-21

## 2023-12-20 RX ORDER — LISINOPRIL 30 MG/1
30 TABLET ORAL DAILY
Qty: 90 TABLET | Refills: 3 | Status: CANCELLED | OUTPATIENT
Start: 2023-12-20

## 2023-12-20 NOTE — NURSING NOTE
"78 year old  Chief Complaint   Patient presents with    Physical       Blood pressure (!) 151/96, pulse 76, temperature 97.2  F (36.2  C), temperature source Skin, resp. rate 15, height 1.638 m (5' 4.5\"), weight 56.7 kg (125 lb), SpO2 97%. Body mass index is 21.12 kg/m .  Patient Active Problem List   Diagnosis    Osteoarthritis of knee    Seasonal allergies    Atrophic vaginitis    Essential hypertension with goal blood pressure less than 140/90    Basal cell adenoma    Hyperlipidemia LDL goal <100    Chronic pain of right knee    SCC (squamous cell carcinoma), face       Wt Readings from Last 2 Encounters:   12/20/23 56.7 kg (125 lb)   11/29/22 57.6 kg (127 lb)     BP Readings from Last 3 Encounters:   12/20/23 (!) 151/96   12/14/22 132/80   11/29/22 126/84         Current Outpatient Medications   Medication    atenolol (TENORMIN) 100 MG tablet    atorvastatin (LIPITOR) 10 MG tablet    calcium carbonate-vitamin D (OSCAL) 500-5 MG-MCG tablet    lisinopril (ZESTRIL) 30 MG tablet    cholecalciferol (VITAMIN  -D) 1000 UNITS capsule     No current facility-administered medications for this visit.       Social History     Tobacco Use    Smoking status: Never    Smokeless tobacco: Never   Vaping Use    Vaping Use: Never used   Substance Use Topics    Alcohol use: Yes     Alcohol/week: 7.0 standard drinks of alcohol     Types: 7 Standard drinks or equivalent per week    Drug use: No       Health Maintenance Due   Topic Date Due    DEXA  Never done    RSV VACCINE (Pregnancy & 60+) (1 - 1-dose 60+ series) Never done    FALL RISK ASSESSMENT  12/07/2022    COLORECTAL CANCER SCREENING  01/07/2023    INFLUENZA VACCINE (1) 09/01/2023    COVID-19 Vaccine (9 - 2023-24 season) 09/01/2023    ZOSTER IMMUNIZATION (3 of 3) 11/03/2023       No results found for: \"PAP\"      December 20, 2023 12:48 PM    "

## 2023-12-21 DIAGNOSIS — E78.5 HYPERLIPIDEMIA LDL GOAL <100: ICD-10-CM

## 2023-12-21 LAB
ALBUMIN SERPL BCG-MCNC: 4.7 G/DL (ref 3.5–5.2)
ALP SERPL-CCNC: 95 U/L (ref 40–150)
ALT SERPL W P-5'-P-CCNC: 18 U/L (ref 0–50)
ANION GAP SERPL CALCULATED.3IONS-SCNC: 12 MMOL/L (ref 7–15)
AST SERPL W P-5'-P-CCNC: 22 U/L (ref 0–45)
BILIRUB SERPL-MCNC: 0.5 MG/DL
BUN SERPL-MCNC: 16.5 MG/DL (ref 8–23)
CALCIUM SERPL-MCNC: 10 MG/DL (ref 8.8–10.2)
CHLORIDE SERPL-SCNC: 100 MMOL/L (ref 98–107)
CHOLEST SERPL-MCNC: 203 MG/DL
CREAT SERPL-MCNC: 0.89 MG/DL (ref 0.51–0.95)
DEPRECATED HCO3 PLAS-SCNC: 26 MMOL/L (ref 22–29)
EGFRCR SERPLBLD CKD-EPI 2021: 66 ML/MIN/1.73M2
FASTING STATUS PATIENT QL REPORTED: NO
GLUCOSE SERPL-MCNC: 91 MG/DL (ref 70–99)
HDLC SERPL-MCNC: 72 MG/DL
LDLC SERPL CALC-MCNC: 114 MG/DL
NONHDLC SERPL-MCNC: 131 MG/DL
POTASSIUM SERPL-SCNC: 4.2 MMOL/L (ref 3.4–5.3)
PROT SERPL-MCNC: 7.8 G/DL (ref 6.4–8.3)
SODIUM SERPL-SCNC: 138 MMOL/L (ref 135–145)
TRIGL SERPL-MCNC: 85 MG/DL

## 2023-12-21 RX ORDER — ATORVASTATIN CALCIUM 20 MG/1
20 TABLET, FILM COATED ORAL DAILY
Qty: 90 TABLET | Refills: 3 | Status: SHIPPED | OUTPATIENT
Start: 2023-12-21

## 2023-12-22 DIAGNOSIS — I10 ESSENTIAL HYPERTENSION WITH GOAL BLOOD PRESSURE LESS THAN 140/90: Chronic | ICD-10-CM

## 2023-12-22 RX ORDER — ATENOLOL 100 MG/1
100 TABLET ORAL DAILY
Qty: 30 TABLET | Refills: 0 | Status: SHIPPED | OUTPATIENT
Start: 2023-12-22 | End: 2024-01-09

## 2023-12-22 NOTE — TELEPHONE ENCOUNTER
Atenolol (Tenormin) 100 mg    Last Office Visit: 12/20/23  [place] [facility] [place]  Medication last refilled: 12/14/22 #90 with 3 refill(s)    Vital Signs Systolic Diastolic Pulse   12/20/23 151 96 76   12/14/22 132 8/0 59   12/7/21 138 86 64     Prescription approved per [facility] Refill Protocol.    TAYLOR VeraN, RN, CCM

## 2023-12-26 DIAGNOSIS — I10 ESSENTIAL HYPERTENSION WITH GOAL BLOOD PRESSURE LESS THAN 140/90: Chronic | ICD-10-CM

## 2023-12-27 RX ORDER — LISINOPRIL 30 MG/1
30 TABLET ORAL DAILY
Qty: 90 TABLET | Refills: 0 | Status: SHIPPED | OUTPATIENT
Start: 2023-12-27 | End: 2024-01-09

## 2023-12-27 NOTE — TELEPHONE ENCOUNTER
Medication requested: lisinopril (ZESTRIL) 30 MG tablet   Last office visit: 12/20/23  [facility] appointments: 1/9/24  Medication last refilled: 12/14/22; 90 + 3 refills  Last qualifying labs:   BP Readings from Last 3 Encounters:   12/20/23 (!) 151/96   12/14/22 132/80   11/29/22 126/84     Component      Latest Ref Rng 12/20/2023  1:51 PM   Potassium      3.4 - 5.3 mmol/L 4.2      Component      Latest Ref Rng 12/20/2023  1:51 PM   Creatinine      0.51 - 0.95 mg/dL 0.89      Prescription approved per [facility] Refill Protocol.    Lencho DEAN, RN  12/27/23 10:18 AM

## 2024-01-09 ENCOUNTER — OFFICE VISIT (OUTPATIENT)
Dept: FAMILY MEDICINE | Facility: CLINIC | Age: 79
End: 2024-01-09
Payer: COMMERCIAL

## 2024-01-09 DIAGNOSIS — I10 ESSENTIAL HYPERTENSION WITH GOAL BLOOD PRESSURE LESS THAN 140/90: Primary | ICD-10-CM

## 2024-01-09 RX ORDER — LISINOPRIL 30 MG/1
30 TABLET ORAL DAILY
Qty: 90 TABLET | Refills: 3 | Status: SHIPPED | OUTPATIENT
Start: 2024-01-09

## 2024-01-09 RX ORDER — ATENOLOL 100 MG/1
100 TABLET ORAL DAILY
Qty: 90 TABLET | Refills: 3 | Status: SHIPPED | OUTPATIENT
Start: 2024-01-09

## 2024-01-09 NOTE — PROGRESS NOTES
"Shana Us is a 78 year old female with hypertension, osteoarthritis, hyperlipidemia, knee pain, skin cancer. She is here for the following issues:    Essential hypertension with goal blood pressure less than 140/90  Last visit 12/20/23  Atenolol 100mg daily + lisinopril 30mg daily  She was asked to return in one month and bring in BP machine.   Her readings: 108/68 at home around noon  Previous readings at home: 130/81, 121/76, 134/80, 135/80  Her machine in clinic reading 143/94, then 167/104, then 149/90  MD reading 144/72  BP Readings from Last 3 Encounters:   01/09/24 (!) 146/82   12/20/23 (!) 151/96   12/14/22 132/80         Patient Active Problem List   Diagnosis    Osteoarthritis of knee    Seasonal allergies    Atrophic vaginitis    Essential hypertension with goal blood pressure less than 140/90    Basal cell adenoma    Hyperlipidemia LDL goal <100    Chronic pain of right knee    SCC (squamous cell carcinoma), face       Current Outpatient Medications   Medication Sig Dispense Refill    atenolol (TENORMIN) 100 MG tablet Take 1 tablet (100 mg) by mouth daily 30 tablet 0    atorvastatin (LIPITOR) 20 MG tablet Take 1 tablet (20 mg) by mouth daily Note change in dose 90 tablet 3    calcium carbonate-vitamin D (OSCAL) 500-5 MG-MCG tablet Take 1 tablet by mouth 2 times daily      lisinopril (ZESTRIL) 30 MG tablet Take 1 tablet (30 mg) by mouth daily 90 tablet 0       Allergies   Allergen Reactions    Oxycodone Nausea and Vomiting     Tolerated dilaudid        EXAM  BP (!) 146/82 (BP Location: Right arm, Patient Position: Sitting, Cuff Size: Adult Regular)   Pulse 60   Temp 97.5  F (36.4  C) (Skin)   Resp 15   Ht 1.638 m (5' 4.5\")   Wt 56.7 kg (125 lb)   SpO2 98%   BMI 21.12 kg/m    Gen: Alert, pleasant, NAD  COR: S1,S2, no murmur      Assessment:  (I10) Essential hypertension with goal blood pressure less than 140/90  (primary encounter diagnosis)  Comment: Blood pressure reading correlates with our " reading, home machine is accurate  Plan: lisinopril (ZESTRIL) 30 MG tablet, atenolol         (TENORMIN) 100 MG tablet        No change in dose of medication, continue checking. RTC 1 yr    Maria G Ross MD  Internal Medicine/Pediatrics

## 2024-01-09 NOTE — NURSING NOTE
"78 year old  Chief Complaint   Patient presents with    RECHECK     Bp check.        Blood pressure (!) 146/82, pulse 60, temperature 97.5  F (36.4  C), temperature source Skin, resp. rate 15, height 1.638 m (5' 4.5\"), weight 56.7 kg (125 lb), SpO2 98%. Body mass index is 21.12 kg/m .  Patient Active Problem List   Diagnosis    Osteoarthritis of knee    Seasonal allergies    Atrophic vaginitis    Essential hypertension with goal blood pressure less than 140/90    Basal cell adenoma    Hyperlipidemia LDL goal <100    Chronic pain of right knee    SCC (squamous cell carcinoma), face       Wt Readings from Last 2 Encounters:   01/09/24 56.7 kg (125 lb)   12/20/23 56.7 kg (125 lb)     BP Readings from Last 3 Encounters:   01/09/24 (!) 146/82   12/20/23 (!) 151/96   12/14/22 132/80         Current Outpatient Medications   Medication    atenolol (TENORMIN) 100 MG tablet    atorvastatin (LIPITOR) 20 MG tablet    calcium carbonate-vitamin D (OSCAL) 500-5 MG-MCG tablet    lisinopril (ZESTRIL) 30 MG tablet     No current facility-administered medications for this visit.       Social History     Tobacco Use    Smoking status: Never    Smokeless tobacco: Never   Vaping Use    Vaping Use: Never used   Substance Use Topics    Alcohol use: Yes     Alcohol/week: 7.0 standard drinks of alcohol     Types: 7 Standard drinks or equivalent per week    Drug use: No       Health Maintenance Due   Topic Date Due    DEXA  Never done    RSV VACCINE (Pregnancy & 60+) (1 - 1-dose 60+ series) Never done    FALL RISK ASSESSMENT  12/07/2022    COLORECTAL CANCER SCREENING  01/07/2023    ZOSTER IMMUNIZATION (3 of 3) 11/03/2023       No results found for: \"PAP\"      January 9, 2024 2:50 PM    "

## 2024-01-13 VITALS
TEMPERATURE: 97.5 F | BODY MASS INDEX: 20.83 KG/M2 | HEIGHT: 65 IN | DIASTOLIC BLOOD PRESSURE: 68 MMHG | RESPIRATION RATE: 15 BRPM | OXYGEN SATURATION: 98 % | HEART RATE: 60 BPM | SYSTOLIC BLOOD PRESSURE: 108 MMHG | WEIGHT: 125 LBS

## 2024-06-14 ENCOUNTER — TRANSFERRED RECORDS (OUTPATIENT)
Dept: HEALTH INFORMATION MANAGEMENT | Facility: CLINIC | Age: 79
End: 2024-06-14
Payer: COMMERCIAL

## 2024-06-26 ENCOUNTER — TRANSFERRED RECORDS (OUTPATIENT)
Dept: HEALTH INFORMATION MANAGEMENT | Facility: CLINIC | Age: 79
End: 2024-06-26
Payer: COMMERCIAL

## 2024-09-17 DIAGNOSIS — I10 ESSENTIAL HYPERTENSION WITH GOAL BLOOD PRESSURE LESS THAN 140/90: ICD-10-CM

## 2024-09-17 RX ORDER — ATENOLOL 100 MG/1
100 TABLET ORAL DAILY
Qty: 90 TABLET | Refills: 1 | OUTPATIENT
Start: 2024-09-17

## 2024-11-13 DIAGNOSIS — E78.5 HYPERLIPIDEMIA LDL GOAL <100: ICD-10-CM

## 2024-11-13 RX ORDER — ATORVASTATIN CALCIUM 20 MG/1
20 TABLET, FILM COATED ORAL DAILY
Qty: 90 TABLET | Refills: 0 | Status: SHIPPED | OUTPATIENT
Start: 2024-11-13

## 2024-11-13 NOTE — TELEPHONE ENCOUNTER
Medication requested: atorvastatin (LIPITOR) 20 MG tablet   Last office visit: 1/9/2024  [facility] appointments: 12/30/2024  Medication last refilled: 12/21/2023; 90 tabs + 3 refills  Last qualifying labs:     Component      Latest Ref Rng 12/20/2023  1:51 PM   LDL Cholesterol Calculated      <=100 mg/dL 114 (H)       Medication is being filled for 1 time refill only due to:   pt has upcoming appt.    DIANNE Moody, RN  11/13/24, 11:05 AM

## 2024-12-02 DIAGNOSIS — I10 ESSENTIAL HYPERTENSION WITH GOAL BLOOD PRESSURE LESS THAN 140/90: ICD-10-CM

## 2024-12-03 RX ORDER — LISINOPRIL 30 MG/1
30 TABLET ORAL DAILY
Qty: 90 TABLET | Refills: 2 | OUTPATIENT
Start: 2024-12-03

## 2024-12-09 DIAGNOSIS — I10 ESSENTIAL HYPERTENSION WITH GOAL BLOOD PRESSURE LESS THAN 140/90: ICD-10-CM

## 2024-12-09 DIAGNOSIS — E78.5 HYPERLIPIDEMIA LDL GOAL <100: ICD-10-CM

## 2024-12-10 RX ORDER — ATORVASTATIN CALCIUM 20 MG/1
20 TABLET, FILM COATED ORAL DAILY
Qty: 90 TABLET | Refills: 3 | OUTPATIENT
Start: 2024-12-10

## 2024-12-10 RX ORDER — LISINOPRIL 30 MG/1
30 TABLET ORAL DAILY
Qty: 90 TABLET | Refills: 0 | Status: SHIPPED | OUTPATIENT
Start: 2024-12-10

## 2024-12-10 NOTE — TELEPHONE ENCOUNTER
Medication requested: lisinopril (ZESTRIL) 30 MG tablet   Last office visit: 1/9/2024  [facility] appointments: 12/30/2024  Medication last refilled: 1/9/2024; 90 tabs + 3 refills  Last qualifying labs:     BP Readings from Last 3 Encounters:   01/09/24 108/68   12/20/23 (!) 151/96   12/14/22 132/80     Component      Latest Ref Rng 12/20/2023  1:51 PM   GFR Estimate      >60 mL/min/1.73m2 66      Component      Latest Ref Rng 12/20/2023  1:51 PM   Potassium      3.4 - 5.3 mmol/L 4.2      Medication is being filled for 1 time refill only due to:   pt has upcoming appt.    DIANNE Moody, RN  12/10/24, 3:04 PM

## 2024-12-27 NOTE — PROGRESS NOTES
Shana Us is a 79 year old female with hx of osteoarthritis, seasonal allergies, HTN, basal cell ca, hyperlipidemia, .  She is here for a general check up.  She is not fasting. She is due for eye exams (for reading only) and dental visits.      HCM  Advanced directive: not on file , at home,   COVID /Flu ---had COVID (moderna) and RSV in [place] October, would like to Influenza today  Other vaccines   up to date  Colon cancer screening ---cologuard 2023, no longer indicated  Pap n/a  Mammogram last 12/8/2023, CRL, mother with breast cancer, she opts for q 2 yr  DEXA none on record---Declines, would not tx with medication if positive    Medicare questions:  Hearing concerns: none  Fall Risk: none, ran and tripped on rug, chipped tooth  11/2024  Independent at home: yes  Safe : throw rugs in the [place] and no grab bars   Memory concerns: none     COGNITIVE SCREEN  1) Repeat 3 items (Banana, Sunrise, Chair)    2) Clock draw: NORMAL  3) 3 item recall: Recalls 2 objects   Results: NORMAL clock, 1-2 items recalled: COGNITIVE IMPAIRMENT LESS LIKELY     Mini-CogTM Copyright [employer]. Licensed by the author for use in [facility]; reprinted with permission (edwar@.[email or web address]). All rights reserved.        Diet: omnivore  Wt Readings from Last 4 Encounters:   12/30/24 56.9 kg (125 lb 6.4 oz)   01/09/24 56.7 kg (125 lb)   12/20/23 56.7 kg (125 lb)   11/29/22 57.6 kg (127 lb)     Body mass index is 21.12 kg/m .       Essential hypertension  Atenolol 100mg daily  Lisinopril 30mg daily  Has home BP machine --numbers lower at home, 125/80  No chest pain , racing heart, exercise intolerance  BP Readings from Last 3 Encounters:   12/30/24 (!) 152/84   01/09/24 108/68   12/20/23 (!) 151/96       Hyperlipidemia LDL goal <100  Atorvastatin 20mg daily  No family hx of heart disease  nonsmoker  Recent Labs   Lab Test 12/20/23  1351 12/14/22  1438 12/07/21  1125 12/15/20  1000 10/08/19  1330   CHOL 203* 246*   < > 221.0* 226.0*   HDL  "72 73   < > 77.0 80.0   * 151*   < > 122.0 126.0   TRIG 85 112   < > 114.0 97.0   CHOLHDLRATIO  --   --   --  2.9 2.8    < > = values in this interval not displayed.     Arthritis   Both hands, L>R  Thumb, CMC joint  Subluxation of left thumb  Declines referral to ortho hand at this time.     PMH, PSH, FH, medications, allergies and immunizations are updated this visit.      Social  , 3 kids, 5 grandkids  [place] Jan - May each year     HABITS:  Tob: never  ETOH: 1 per day  Calcium: + Calcium supplement/+D chews  Caffeine: 2 per day  Exercise: tennis , golf      OB/GYN HISTORY:  LMP: postmenopausal, no vaginal bleeding  Trial of estrogen cream , messy,  declines Rx , uses Replens  Vasomotor sx:  no  G 3   P 3   A 0  Self Breast exam:  yes         Current Outpatient Medications   Medication Sig Dispense Refill    atenolol (TENORMIN) 100 MG tablet Take 1 tablet (100 mg) by mouth daily 90 tablet 3    atorvastatin (LIPITOR) 20 MG tablet TAKE 1 TABLET (20 MG) BY MOUTH DAILY NOTE CHANGE IN DOSE 90 tablet 0    calcium carbonate-vitamin D (OSCAL) 500-5 MG-MCG tablet Take 1 tablet by mouth 2 times daily      lisinopril (ZESTRIL) 30 MG tablet TAKE 1 TABLET BY MOUTH EVERY DAY 90 tablet 0     Allergies   Allergen Reactions    Oxycodone Nausea and Vomiting     Tolerated dilaudid        ROS: 10 point ROS neg other than the symptoms noted above in the HPI.      EXAM  BP (!) 152/84 (BP Location: Left arm, Patient Position: Sitting, Cuff Size: Adult Regular)   Pulse 63   Temp 97.8  F (36.6  C) (Skin)   Resp 15   Ht 1.641 m (5' 4.61\")   Wt 56.9 kg (125 lb 6.4 oz)   SpO2 98%   BMI 21.12 kg/m    Repeat /86    GENERAL APPEARANCE: Alert, pleasant, NAD  EYES: PERRL, EOMI, conjunctiva clear  HENT: TM normal bilaterally. Nose and mouth without lesions  NECK: no adenopathy, thyroid normal to palpation  RESP: lungs clear to auscultation bilaterally  BREAST: normal without masses, no tenderness or nipple discharge " and no palpable  axillary masses or adenopathy  CV: regular rate and rhythm, normal S1 S2, no murmur, no carotid bruits  ABDOMEN: soft, nontender, without HSM or masses. Bowel sounds normal  MS: extremities normal- no gross deformities noted, no tender, hot or swollen joints.    Left thumb: subluxation, no tenderness with palpation over CMC of either hand  SKIN: no suspicious lesions or rashes  NEURO: Normal strength and tone, sensory exam grossly normal, DTR normoreflexive in upper and lower extremities  PSYCH: mentation appears normal. and affect normal/bright.  EXT: no peripheral edema, pedal pulses palpable    Assessment:  (Z00.00) Encounter for Medicare annual wellness exam  (primary encounter diagnosis)  Comment: 79 year old woman  in good health  Plan: Today we discussed ways to maintain a healthy lifestyle with sensible eating, regular exercise and self cares. We dicussed calcium and Vitamin D intake, vaccinations and preventive health screens.    Discussed mammogram imaging every other year given family hx. High dose influenza vaccine given today.     (I10) Essential hypertension with goal blood pressure less than 140/90  Comment: BP is high in clinic today, she has a home BP monitor and reports readings are lower  Plan: atenolol (TENORMIN) 100 MG tablet,         Comprehensive metabolic panel        Recommend she bring BP machine into clinic for nurse visit to check accuracy. If BP remain elevated then increase Lisinopril to 40mg daily    (E78.5) Hyperlipidemia LDL goal <100  Comment: not fasting, total and LDL above target range  Plan: atorvastatin (LIPITOR) 20 MG tablet, Atorvastatin 40mg daily  Lipid Profile, Comprehensive metabolic panel  Recent Labs   Lab Test 12/30/24  1438 12/20/23  1351 12/07/21  1125 12/15/20  1000 10/08/19  1330   CHOL 232* 203*   < > 221.0* 226.0*   HDL 79 72   < > 77.0 80.0   * 114*   < > 122.0 126.0   TRIG 151* 85   < > 114.0 97.0   CHOLHDLRATIO  --   --   --  2.9 2.8     < > = values in this interval not displayed.           Increase atorvastatin dose from 20mg to 30mg daily, repeat lipid panel in 3 mos    (E83.52) Hypercalcemia  Comment: mild elevation of Ca 10.5., she is traveling to [place]  Plan: Basic metabolic panel, Parathyroid Hormone         Intact, Vitamin D Deficiency, Protein         electrophoresis        Recommend returning for labs in 3 mos or may have doctor in [place] do workup              Maria G Ross MD  Internal Medicine/Pediatrics

## 2024-12-28 RX ORDER — LISINOPRIL 30 MG/1
30 TABLET ORAL DAILY
Qty: 90 TABLET | Refills: 3 | Status: CANCELLED | OUTPATIENT
Start: 2024-12-28

## 2024-12-29 NOTE — PATIENT INSTRUCTIONS
Calcium 1200mg daily  Vitamin D 62554 international unit(s)       Patient Education   Preventive Care Advice   This is general advice given by our system to help you stay healthy. However, your care team may have specific advice just for you. Please talk to your care team about your preventive care needs.  Nutrition  Eat 5 or more servings of fruits and vegetables each day.  Try wheat bread, brown rice and whole grain pasta (instead of white bread, rice, and pasta).  Get enough calcium and vitamin D. Check the label on foods and aim for 100% of the RDA (recommended daily allowance).  Lifestyle  Exercise at least 150 minutes each week  (30 minutes a day, 5 days a week).  Do muscle strengthening activities 2 days a week. These help control your weight and prevent disease.  No smoking.  Wear sunscreen to prevent skin cancer.  Have a dental exam and cleaning every 6 months.  Yearly exams  See your health care team every year to talk about:  Any changes in your health.  Any medicines your care team has prescribed.  Preventive care, family planning, and ways to prevent chronic diseases.  Shots (vaccines)   HPV shots (up to age 26), if you've never had them before.  Hepatitis B shots (up to age 59), if you've never had them before.  COVID-19 shot: Get this shot when it's due.  Flu shot: Get a flu shot every year.  Tetanus shot: Get a tetanus shot every 10 years.  Pneumococcal, hepatitis A, and RSV shots: Ask your care team if you need these based on your risk.  Shingles shot (for age 50 and up)  General health tests  Diabetes screening:  Starting at age 35, Get screened for diabetes at least every 3 years.  If you are younger than age 35, ask your care team if you should be screened for diabetes.  Cholesterol test: At age 39, start having a cholesterol test every 5 years, or more often if advised.  Bone density scan (DEXA): At age 50, ask your care team if you should have this scan for osteoporosis (brittle  bones).  Hepatitis C: Get tested at least once in your life.  STIs (sexually transmitted infections)  Before age 24: Ask your care team if you should be screened for STIs.  After age 24: Get screened for STIs if you're at risk. You are at risk for STIs (including HIV) if:  You are sexually active with more than one person.  You don't use condoms every time.  You or a partner was diagnosed with a sexually transmitted infection.  If you are at risk for HIV, ask about PrEP medicine to prevent HIV.  Get tested for HIV at least once in your life, whether you are at risk for HIV or not.  Cancer screening tests  Cervical cancer screening: If you have a cervix, begin getting regular cervical cancer screening tests starting at age 21.  Breast cancer scan (mammogram): If you've ever had breasts, begin having regular mammograms starting at age 40. This is a scan to check for breast cancer.  Colon cancer screening: It is important to start screening for colon cancer at age 45.  Have a colonoscopy test every 10 years (or more often if you're at risk) Or, ask your provider about stool tests like a FIT test every year or Cologuard test every 3 years.  To learn more about your testing options, visit:   .  For help making a decision, visit:   https://bit.ly/pe48579.  Prostate cancer screening test: If you have a prostate, ask your care team if a prostate cancer screening test (PSA) at age 55 is right for you.  Lung cancer screening: If you are a current or former smoker ages 50 to 80, ask your care team if ongoing lung cancer screenings are right for you.  For informational purposes only. Not to replace the advice of your health care provider. Copyright   2023 [employer]. All rights reserved. Clinically reviewed by the [facility] Transitions Program. [employer] [id number] - REV 01/24.

## 2024-12-30 ENCOUNTER — OFFICE VISIT (OUTPATIENT)
Dept: FAMILY MEDICINE | Facility: CLINIC | Age: 79
End: 2024-12-30
Payer: COMMERCIAL

## 2024-12-30 VITALS
OXYGEN SATURATION: 98 % | HEIGHT: 65 IN | BODY MASS INDEX: 20.89 KG/M2 | RESPIRATION RATE: 15 BRPM | DIASTOLIC BLOOD PRESSURE: 86 MMHG | HEART RATE: 63 BPM | SYSTOLIC BLOOD PRESSURE: 162 MMHG | TEMPERATURE: 97.8 F | WEIGHT: 125.4 LBS

## 2024-12-30 DIAGNOSIS — E78.5 HYPERLIPIDEMIA LDL GOAL <100: ICD-10-CM

## 2024-12-30 DIAGNOSIS — I10 ESSENTIAL HYPERTENSION WITH GOAL BLOOD PRESSURE LESS THAN 140/90: ICD-10-CM

## 2024-12-30 DIAGNOSIS — E83.52 HYPERCALCEMIA: ICD-10-CM

## 2024-12-30 DIAGNOSIS — Z00.00 ENCOUNTER FOR MEDICARE ANNUAL WELLNESS EXAM: Primary | ICD-10-CM

## 2024-12-30 LAB
ALBUMIN SERPL BCG-MCNC: 5 G/DL (ref 3.5–5.2)
ALP SERPL-CCNC: 88 U/L (ref 40–150)
ALT SERPL W P-5'-P-CCNC: 33 U/L (ref 0–50)
ANION GAP SERPL CALCULATED.3IONS-SCNC: 13 MMOL/L (ref 7–15)
AST SERPL W P-5'-P-CCNC: 32 U/L (ref 0–45)
BILIRUB SERPL-MCNC: 0.7 MG/DL
BUN SERPL-MCNC: 15.7 MG/DL (ref 8–23)
CALCIUM SERPL-MCNC: 10.5 MG/DL (ref 8.8–10.4)
CHLORIDE SERPL-SCNC: 100 MMOL/L (ref 98–107)
CHOLEST SERPL-MCNC: 232 MG/DL
CREAT SERPL-MCNC: 0.85 MG/DL (ref 0.51–0.95)
EGFRCR SERPLBLD CKD-EPI 2021: 70 ML/MIN/1.73M2
FASTING STATUS PATIENT QL REPORTED: NO
FASTING STATUS PATIENT QL REPORTED: NO
GLUCOSE SERPL-MCNC: 92 MG/DL (ref 70–99)
HCO3 SERPL-SCNC: 25 MMOL/L (ref 22–29)
HDLC SERPL-MCNC: 79 MG/DL
LDLC SERPL CALC-MCNC: 123 MG/DL
NONHDLC SERPL-MCNC: 153 MG/DL
POTASSIUM SERPL-SCNC: 4.2 MMOL/L (ref 3.4–5.3)
PROT SERPL-MCNC: 8.1 G/DL (ref 6.4–8.3)
SODIUM SERPL-SCNC: 138 MMOL/L (ref 135–145)
TRIGL SERPL-MCNC: 151 MG/DL

## 2024-12-30 PROCEDURE — 82374 ASSAY BLOOD CARBON DIOXIDE: CPT | Mod: ORL | Performed by: INTERNAL MEDICINE

## 2024-12-30 PROCEDURE — 84478 ASSAY OF TRIGLYCERIDES: CPT | Mod: ORL | Performed by: INTERNAL MEDICINE

## 2024-12-30 PROCEDURE — 80048 BASIC METABOLIC PNL TOTAL CA: CPT | Mod: ORL | Performed by: INTERNAL MEDICINE

## 2024-12-30 RX ORDER — ATENOLOL 100 MG/1
100 TABLET ORAL DAILY
Qty: 90 TABLET | Refills: 3 | Status: SHIPPED | OUTPATIENT
Start: 2024-12-30

## 2024-12-30 RX ORDER — ATORVASTATIN CALCIUM 20 MG/1
20 TABLET, FILM COATED ORAL DAILY
Qty: 90 TABLET | Refills: 3 | Status: SHIPPED | OUTPATIENT
Start: 2024-12-30 | End: 2024-12-31

## 2024-12-30 SDOH — HEALTH STABILITY: PHYSICAL HEALTH: ON AVERAGE, HOW MANY MINUTES DO YOU ENGAGE IN EXERCISE AT THIS LEVEL?: 90 MIN

## 2024-12-30 SDOH — HEALTH STABILITY: PHYSICAL HEALTH: ON AVERAGE, HOW MANY DAYS PER WEEK DO YOU ENGAGE IN MODERATE TO STRENUOUS EXERCISE (LIKE A BRISK WALK)?: 4 DAYS

## 2024-12-30 ASSESSMENT — SOCIAL DETERMINANTS OF HEALTH (SDOH)
HOW OFTEN DO YOU GET TOGETHER WITH FRIENDS OR RELATIVES?: MORE THAN THREE TIMES A WEEK
HOW OFTEN DO YOU GET TOGETHER WITH FRIENDS OR RELATIVES?: MORE THAN THREE TIMES A WEEK

## 2024-12-30 ASSESSMENT — PAIN SCALES - GENERAL: PAINLEVEL_OUTOF10: NO PAIN (0)

## 2024-12-30 NOTE — NURSING NOTE
"79 year old    Chief Complaint   Patient presents with    Physical        Blood pressure (!) 165/81, pulse 63, temperature 97.8  F (36.6  C), temperature source Skin, resp. rate 15, height 1.641 m (5' 4.61\"), weight 56.9 kg (125 lb 6.4 oz), SpO2 98%. Body mass index is 21.12 kg/m .    Patient Active Problem List   Diagnosis    Osteoarthritis of knee    Seasonal allergies    Atrophic vaginitis    Essential hypertension with goal blood pressure less than 140/90    Basal cell adenoma    Hyperlipidemia LDL goal <100    Chronic pain of right knee    SCC (squamous cell carcinoma), face          Wt Readings from Last 2 Encounters:   12/30/24 56.9 kg (125 lb 6.4 oz)   01/09/24 56.7 kg (125 lb)       BP Readings from Last 3 Encounters:   12/30/24 (!) 165/81   01/09/24 108/68   12/20/23 (!) 151/96             Current Outpatient Medications   Medication Sig Dispense Refill    atenolol (TENORMIN) 100 MG tablet Take 1 tablet (100 mg) by mouth daily 90 tablet 3    atorvastatin (LIPITOR) 20 MG tablet TAKE 1 TABLET (20 MG) BY MOUTH DAILY NOTE CHANGE IN DOSE 90 tablet 0    calcium carbonate-vitamin D (OSCAL) 500-5 MG-MCG tablet Take 1 tablet by mouth 2 times daily      lisinopril (ZESTRIL) 30 MG tablet TAKE 1 TABLET BY MOUTH EVERY DAY 90 tablet 0     No current facility-administered medications for this visit.          Social History     Tobacco Use    Smoking status: Never    Smokeless tobacco: Never   Vaping Use    Vaping status: Never Used   Substance Use Topics    Alcohol use: Yes     Alcohol/week: 7.0 standard drinks of alcohol     Types: 7 Standard drinks or equivalent per week    Drug use: No          Health Maintenance Due   Topic Date Due    DEXA  Never done    RSV VACCINE (1 - 1-dose 75+ series) Never done    COLORECTAL CANCER SCREENING  01/07/2023    INFLUENZA VACCINE (1) 09/01/2024    COVID-19 Vaccine (9 - 2024-25 season) 09/01/2024    BMP  12/20/2024    LIPID  12/20/2024        No results found for: \"PAP\"      "   December 30, 2024 1:20 PM

## 2024-12-31 PROBLEM — E83.52 HYPERCALCEMIA: Status: ACTIVE | Noted: 2024-12-31

## 2024-12-31 RX ORDER — ATORVASTATIN CALCIUM 40 MG/1
40 TABLET, FILM COATED ORAL DAILY
Qty: 90 TABLET | Refills: 3 | Status: SHIPPED | OUTPATIENT
Start: 2024-12-31

## 2025-01-08 ENCOUNTER — ALLIED HEALTH/NURSE VISIT (OUTPATIENT)
Dept: FAMILY MEDICINE | Facility: CLINIC | Age: 80
End: 2025-01-08
Payer: COMMERCIAL

## 2025-01-08 VITALS — HEART RATE: 62 BPM | SYSTOLIC BLOOD PRESSURE: 150 MMHG | DIASTOLIC BLOOD PRESSURE: 79 MMHG

## 2025-01-08 DIAGNOSIS — Z01.30 BLOOD PRESSURE CHECK: Primary | ICD-10-CM

## 2025-01-08 NOTE — PROGRESS NOTES
Pt came in today for a blood pressure check and to test her at home cuff against the clinic cuff.       1st readin/87 (Home Cuff)  2nd readin/79 (Clinic cuff, adult small)  3rd readin/81 (Home cuff)  4th readin/79 (Clinic cuff, adult small)  (LEFT arm)      5th readin/85 (Clinic cuff, adult small)  (RIGHT arm)

## 2025-03-05 DIAGNOSIS — I10 ESSENTIAL HYPERTENSION WITH GOAL BLOOD PRESSURE LESS THAN 140/90: ICD-10-CM

## 2025-03-05 RX ORDER — LISINOPRIL 30 MG/1
30 TABLET ORAL DAILY
Qty: 90 TABLET | Refills: 3 | Status: SHIPPED | OUTPATIENT
Start: 2025-03-05

## 2025-03-05 NOTE — TELEPHONE ENCOUNTER
Medication requested: lisinopril (ZESTRIL) 30 MG tablet   Last office visit: 12/30/24  [facility] appointments: none  Medication last refilled: 12/10/24; 90 + 0 refills  Last qualifying labs:   BP Readings from Last 3 Encounters:   01/08/25 (!) 150/79   12/30/24 (!) 162/86   01/09/24 108/68     Component      Latest Ref Rng 12/30/2024  2:38 PM   Potassium      3.4 - 5.3 mmol/L 4.2      Component      Latest Ref Rng 12/30/2024  2:38 PM   GFR Estimate      >60 mL/min/1.73m2 70      Routing refill request to provider for review/approval because:  Elevated BP at last two visits    Lencho DEAN, RN  03/05/25 1:45 PM

## 2025-05-13 ENCOUNTER — LAB (OUTPATIENT)
Dept: LAB | Facility: CLINIC | Age: 80
End: 2025-05-13
Payer: COMMERCIAL

## 2025-05-13 DIAGNOSIS — E83.52 HYPERCALCEMIA: ICD-10-CM

## 2025-05-13 DIAGNOSIS — E78.5 HYPERLIPIDEMIA LDL GOAL <100: ICD-10-CM

## 2025-05-13 LAB
ANION GAP SERPL CALCULATED.3IONS-SCNC: 11 MMOL/L (ref 7–15)
BUN SERPL-MCNC: 14.7 MG/DL (ref 8–23)
CALCIUM SERPL-MCNC: 9.9 MG/DL (ref 8.8–10.4)
CHLORIDE SERPL-SCNC: 101 MMOL/L (ref 98–107)
CHOLEST SERPL-MCNC: 191 MG/DL
CREAT SERPL-MCNC: 0.87 MG/DL (ref 0.51–0.95)
EGFRCR SERPLBLD CKD-EPI 2021: 68 ML/MIN/1.73M2
FASTING STATUS PATIENT QL REPORTED: ABNORMAL
FASTING STATUS PATIENT QL REPORTED: NORMAL
GLUCOSE SERPL-MCNC: 102 MG/DL (ref 70–99)
HCO3 SERPL-SCNC: 26 MMOL/L (ref 22–29)
HDLC SERPL-MCNC: 76 MG/DL
LDLC SERPL CALC-MCNC: 98 MG/DL
NONHDLC SERPL-MCNC: 115 MG/DL
POTASSIUM SERPL-SCNC: 4.3 MMOL/L (ref 3.4–5.3)
SODIUM SERPL-SCNC: 138 MMOL/L (ref 135–145)
TOTAL PROTEIN SERUM FOR ELP: 7 G/DL (ref 6.4–8.3)
TRIGL SERPL-MCNC: 86 MG/DL
VIT D+METAB SERPL-MCNC: 42 NG/ML (ref 20–50)

## 2025-05-13 PROCEDURE — 83970 ASSAY OF PARATHORMONE: CPT | Mod: ORL | Performed by: INTERNAL MEDICINE

## 2025-05-13 PROCEDURE — 82306 VITAMIN D 25 HYDROXY: CPT | Mod: ORL | Performed by: INTERNAL MEDICINE

## 2025-05-13 PROCEDURE — 84155 ASSAY OF PROTEIN SERUM: CPT | Mod: ORL | Performed by: INTERNAL MEDICINE

## 2025-05-13 PROCEDURE — 84165 PROTEIN E-PHORESIS SERUM: CPT | Mod: 26 | Performed by: PATHOLOGY

## 2025-05-13 PROCEDURE — 82465 ASSAY BLD/SERUM CHOLESTEROL: CPT | Mod: ORL | Performed by: INTERNAL MEDICINE

## 2025-05-13 PROCEDURE — 84165 PROTEIN E-PHORESIS SERUM: CPT | Mod: TC,ORL | Performed by: PATHOLOGY

## 2025-05-13 PROCEDURE — 80048 BASIC METABOLIC PNL TOTAL CA: CPT | Mod: ORL | Performed by: INTERNAL MEDICINE

## 2025-05-14 ENCOUNTER — RESULTS FOLLOW-UP (OUTPATIENT)
Dept: FAMILY MEDICINE | Facility: CLINIC | Age: 80
End: 2025-05-14

## 2025-05-14 DIAGNOSIS — R77.8 ABNORMAL SPEP: Primary | ICD-10-CM

## 2025-05-14 LAB
ALBUMIN SERPL ELPH-MCNC: 4.4 G/DL (ref 3.7–5.1)
ALPHA1 GLOB SERPL ELPH-MCNC: 0.3 G/DL (ref 0.2–0.4)
ALPHA2 GLOB SERPL ELPH-MCNC: 0.6 G/DL (ref 0.5–0.9)
B-GLOBULIN SERPL ELPH-MCNC: 0.8 G/DL (ref 0.6–1)
GAMMA GLOB SERPL ELPH-MCNC: 0.8 G/DL (ref 0.7–1.6)
LOCATION OF TASK: ABNORMAL
M PROTEIN SERPL ELPH-MCNC: 0.1 G/DL
PROT PATTERN SERPL ELPH-IMP: ABNORMAL
PTH-INTACT SERPL-MCNC: 36 PG/ML (ref 15–65)

## 2025-05-18 NOTE — PROGRESS NOTES
[facility]  [address]  [facility] [address]  Phone: [phone]  Fax: [phone]    Patient:  Shana Us 1945  Date of Visit:  12:35 AM  Referring Provider Referred Self      Assessment & Plan    (R77.8) Abnormal serum protein electrophoresis  (primary encounter diagnosis)  Comment: hx of hypercalcemia. Follow up testing found small M spike on SPEP. Comprehensive panel normal, calcium normal. PTH and Vit D normal. No paresthesias, fractures. This is an incidental finding that now requires surveillance.   Plan: Adult Oncology/Hematology  Referral        Referral placed to hematology clinic. I indicated to Shana that additional labs will likely be ordered, so did not add any today. Gave information on MGUS diagnosis and discussed need for surveillance.     (E28.39) Estrogen deficiency  Comment: remote hx of DEXA which she said was normal, due for updated DEXA,no hx of fracture  Plan: DX Bone Density        Referral placed today.    Maria G Ross MD       Shana Us is a 79 year old female with hx of osteoarthritis, seasonal allergies, HTN, basal cell ca, hyperlipidemia. She is here to discuss:    Abnormal serum protein electrophoresis  History of hypercalcemia 10.5 in Dec 2024  I recommended comprehensive workup to look for cause  Shana travels in the winter months, only recently returned to [place] for labs  Small M spike on serum protein electrophoresis.  Serum Cr normal , serum protein normal, parathyroid hormone normal  Vitamin D, normal, 42  Today  Wishes to discuss significance of small M spike      Patient Active Problem List   Diagnosis    Osteoarthritis of knee    Seasonal allergies    Atrophic vaginitis    Essential hypertension with goal blood pressure less than 140/90    Basal cell adenoma    Hyperlipidemia LDL goal <100    Chronic pain of right knee    SCC (squamous cell carcinoma), face    Hypercalcemia        Current Outpatient Medications   Medication Sig Dispense Refill    atenolol (TENORMIN) 100 MG tablet Take 1 tablet (100 mg) by mouth daily. 90 tablet 3    atorvastatin (LIPITOR) 40 MG tablet Take 1 tablet (40 mg) by mouth daily. Note change in dose 90 tablet 3    calcium carbonate-vitamin D (OSCAL) 500-5 MG-MCG tablet Take 1 tablet by mouth 2 times daily      lisinopril (ZESTRIL) 30 MG tablet TAKE 1 TABLET BY MOUTH EVERY DAY 90 tablet 3       Allergies   Allergen Reactions    Oxycodone Nausea and Vomiting     Tolerated dilaudid        EXAM  Temp 97.7  F (36.5  C) (Skin) --she declined additional vitals today  Gen: Alert, pleasant, NAD  Discussion only.

## 2025-05-21 ENCOUNTER — OFFICE VISIT (OUTPATIENT)
Dept: FAMILY MEDICINE | Facility: CLINIC | Age: 80
End: 2025-05-21
Payer: COMMERCIAL

## 2025-05-21 VITALS — TEMPERATURE: 97.7 F

## 2025-05-21 DIAGNOSIS — R77.8 ABNORMAL SERUM PROTEIN ELECTROPHORESIS: Primary | ICD-10-CM

## 2025-05-21 DIAGNOSIS — E28.39 ESTROGEN DEFICIENCY: ICD-10-CM

## 2025-05-21 NOTE — NURSING NOTE
"79 year old    Chief Complaint   Patient presents with    Follow Up     Check in on labs       Patient declined vitals today         Temperature 97.7  F (36.5  C), temperature source Skin. There is no height or weight on file to calculate BMI.    Patient Active Problem List   Diagnosis    Osteoarthritis of knee    Seasonal allergies    Atrophic vaginitis    Essential hypertension with goal blood pressure less than 140/90    Basal cell adenoma    Hyperlipidemia LDL goal <100    Chronic pain of right knee    SCC (squamous cell carcinoma), face    Hypercalcemia          Wt Readings from Last 2 Encounters:   12/30/24 56.9 kg (125 lb 6.4 oz)   01/09/24 56.7 kg (125 lb)       BP Readings from Last 3 Encounters:   01/08/25 (!) 150/79   12/30/24 (!) 162/86   01/09/24 108/68             Current Outpatient Medications   Medication Sig Dispense Refill    atenolol (TENORMIN) 100 MG tablet Take 1 tablet (100 mg) by mouth daily. 90 tablet 3    atorvastatin (LIPITOR) 40 MG tablet Take 1 tablet (40 mg) by mouth daily. Note change in dose 90 tablet 3    calcium carbonate-vitamin D (OSCAL) 500-5 MG-MCG tablet Take 1 tablet by mouth 2 times daily      lisinopril (ZESTRIL) 30 MG tablet TAKE 1 TABLET BY MOUTH EVERY DAY 90 tablet 3     No current facility-administered medications for this visit.          Social History     Tobacco Use    Smoking status: Never    Smokeless tobacco: Never   Vaping Use    Vaping status: Never Used   Substance Use Topics    Alcohol use: Yes     Alcohol/week: 7.0 standard drinks of alcohol     Types: 7 Standard drinks or equivalent per week    Drug use: No          Health Maintenance Due   Topic Date Due    COVID-19 Vaccine (10 - 2024-25 season) 04/08/2025        No results found for: \"PAP\"        May 21, 2025 3:14 PM        "

## 2025-05-22 ENCOUNTER — PATIENT OUTREACH (OUTPATIENT)
Dept: CARE COORDINATION | Facility: CLINIC | Age: 80
End: 2025-05-22
Payer: COMMERCIAL

## 2025-05-24 ENCOUNTER — E-CONSULT (OUTPATIENT)
Dept: TRANSPLANT | Facility: CLINIC | Age: 80
End: 2025-05-24
Payer: COMMERCIAL

## 2025-05-24 PROCEDURE — 99451 NTRPROF PH1/NTRNET/EHR 5/>: CPT | Performed by: INTERNAL MEDICINE

## 2025-05-25 NOTE — PROGRESS NOTES
5/24/2025     E-Consult has been accepted.    Interprofessional consultation requested by:  Maria G Ross MD      Clinical Question/Purpose: MY CLINICAL QUESTION IS:   What further testing, if any is recommended at this time? MGUS was incidental finding, done to workup hypercalcemia, which has since resolved.     Patient assessment and information reviewed:   Ca 10.5 in 12/30/24, but albumin was high at 5.0.   Cr 0.85, eGFR 70, no CBC done at that time.  Last CBCdiff 12/7/21 was completely normal.  Previous Ca levels usually normal, but mildly elevated to 10.3 on 12/14/22 with albumin 4.8    5/13/25 Labs done to work up hypercalcemia  Ca down to 9.9, no albumin  iPTH normal  Vit D normal  SPEP with 0.1 monoclonal peak    Recommendations:   MGUS is very common (>4% of the population over 50), and usually a benign finding.   Check serum immunofixation, and kappa/lambda ratio and IgG/IgA/IgM. Check CBC and ionized calcium.  OK to do UPEP and urine immunofixation. Happy to help with interpreting results - please let me know when they come back.     If no anemia, hypercalcemia, or kidney failure, does not need further imaging if:  - IgG monoclonal protein <1.5 g/dL (15 g/L) and a normal serum kappa/lambda ratio  - Light chain only monoclonal protein with serum FLC ratio <8  - IgM monoclonal protein with no clinical concern for bone lesions or myeloma      Bone marrow evaluation can be deferred in individuals with no evidence of anemia, lymphadenopathy, or organomegaly with the following low-risk scenarios:  - IgG monoclonal protein <1.5 g/dL (15 g/L) with normal FLC ratio  - Light chain only monoclonal protein with FLC ratio <8  - IgM monoclonal protein <1.5 g/dL (15 g/L)      Low risk MGUS:   Follow CBC, CMP, SPEP, immunofixation, kappa/lambda ratio q6 months  If stable x 2, can then just follow CBC and CMP yearly.   If they meet low risk criteria above, then chances of MGUS transforming to MM is  <1%/year.    Things for her to watch out for, which will need medical evaluation and repeat MGUS testing:  - symptoms of anemia (fatigue, looking pale, orthostasis)  - increased bleeding/bruising  - serious infection, e.g. hospitalization but also frequent or prolonged viral infections.  - bony pains, unexplained fractures  - fever, night sweats soaking the sheets/clothes, unexplained weight loss  - enlarged lymph nodes  - appetite issues or swollen abdomen  - symptoms of hypercalcemia - unexplained constipation, unexplained polyuria     The recommendations provided in this E-Consult are based on a review of clinical data pertinent to the clinical question presented, without a review of the patient's complete medical record or, the benefit of a comprehensive in-person or virtual patient evaluation. This consultation should not replace the clinical judgement and evaluation of the provider ordering this E-Consult. Any new clinical issues, or changes in patient status since the filing of this E-Consult will need to be taken into account when assessing these recommendations. Please contact me if you have further questions.    My total time spent reviewing clinical information and formulating assessment was 10 minutes.    Maldonado Carcamo MD

## 2025-05-28 ENCOUNTER — MYC MEDICAL ADVICE (OUTPATIENT)
Dept: FAMILY MEDICINE | Facility: CLINIC | Age: 80
End: 2025-05-28

## 2025-05-28 DIAGNOSIS — R77.8 ABNORMAL SERUM PROTEIN ELECTROPHORESIS: Primary | ICD-10-CM

## 2025-07-10 ENCOUNTER — LAB (OUTPATIENT)
Dept: LAB | Facility: CLINIC | Age: 80
End: 2025-07-10
Payer: COMMERCIAL

## 2025-07-10 DIAGNOSIS — R77.8 ABNORMAL SERUM PROTEIN ELECTROPHORESIS: ICD-10-CM

## 2025-07-10 LAB
ALBUMIN SERPL BCG-MCNC: 4.7 G/DL (ref 3.5–5.2)
ALP SERPL-CCNC: 87 U/L (ref 40–150)
ALT SERPL W P-5'-P-CCNC: 21 U/L (ref 0–50)
ANION GAP SERPL CALCULATED.3IONS-SCNC: 11 MMOL/L (ref 7–15)
AST SERPL W P-5'-P-CCNC: 23 U/L (ref 0–45)
BILIRUB SERPL-MCNC: 0.6 MG/DL
BUN SERPL-MCNC: 14.6 MG/DL (ref 8–23)
CA-I BLD-MCNC: 5 MG/DL (ref 4.4–5.2)
CALCIUM SERPL-MCNC: 10.3 MG/DL (ref 8.8–10.4)
CHLORIDE SERPL-SCNC: 102 MMOL/L (ref 98–107)
CREAT SERPL-MCNC: 0.88 MG/DL (ref 0.51–0.95)
EGFRCR SERPLBLD CKD-EPI 2021: 67 ML/MIN/1.73M2
ERYTHROCYTE [DISTWIDTH] IN BLOOD BY AUTOMATED COUNT: 12.2 % (ref 10–15)
GLUCOSE SERPL-MCNC: 107 MG/DL (ref 70–99)
HCO3 SERPL-SCNC: 26 MMOL/L (ref 22–29)
HCT VFR BLD AUTO: 40.2 % (ref 35–47)
HGB BLD-MCNC: 13.8 G/DL (ref 11.7–15.7)
MCH RBC QN AUTO: 30.7 PG (ref 26.5–33)
MCHC RBC AUTO-ENTMCNC: 34.3 G/DL (ref 31.5–36.5)
MCV RBC AUTO: 90 FL (ref 78–100)
PLATELET # BLD AUTO: 278 10E3/UL (ref 150–450)
POTASSIUM SERPL-SCNC: 4.9 MMOL/L (ref 3.4–5.3)
PROT SERPL-MCNC: 7.6 G/DL (ref 6.4–8.3)
RBC # BLD AUTO: 4.49 10E6/UL (ref 3.8–5.2)
SODIUM SERPL-SCNC: 139 MMOL/L (ref 135–145)
WBC # BLD AUTO: 6.9 10E3/UL (ref 4–11)

## 2025-07-10 PROCEDURE — 82784 ASSAY IGA/IGD/IGG/IGM EACH: CPT | Mod: ORL | Performed by: INTERNAL MEDICINE

## 2025-07-10 PROCEDURE — 80053 COMPREHEN METABOLIC PANEL: CPT | Mod: ORL | Performed by: INTERNAL MEDICINE

## 2025-07-10 PROCEDURE — 82330 ASSAY OF CALCIUM: CPT | Mod: ORL | Performed by: INTERNAL MEDICINE

## 2025-07-10 PROCEDURE — 86334 IMMUNOFIX E-PHORESIS SERUM: CPT | Mod: ORL | Performed by: PATHOLOGY

## 2025-07-16 PROBLEM — R77.8 ABNORMAL SPEP: Status: ACTIVE | Noted: 2025-07-16

## 2025-07-17 PROCEDURE — 86335 IMMUNFIX E-PHORSIS/URINE/CSF: CPT | Mod: ORL | Performed by: STUDENT IN AN ORGANIZED HEALTH CARE EDUCATION/TRAINING PROGRAM
